# Patient Record
Sex: MALE | Race: WHITE | NOT HISPANIC OR LATINO | Employment: FULL TIME | URBAN - METROPOLITAN AREA
[De-identification: names, ages, dates, MRNs, and addresses within clinical notes are randomized per-mention and may not be internally consistent; named-entity substitution may affect disease eponyms.]

---

## 2019-05-01 ENCOUNTER — TRANSFERRED RECORDS (OUTPATIENT)
Dept: HEALTH INFORMATION MANAGEMENT | Facility: CLINIC | Age: 49
End: 2019-05-01

## 2021-06-02 ENCOUNTER — RECORDS - HEALTHEAST (OUTPATIENT)
Dept: ADMINISTRATIVE | Facility: CLINIC | Age: 51
End: 2021-06-02

## 2021-12-29 ENCOUNTER — E-VISIT (OUTPATIENT)
Dept: URGENT CARE | Facility: URGENT CARE | Age: 51
End: 2021-12-29
Payer: COMMERCIAL

## 2021-12-29 DIAGNOSIS — R06.02 SOB (SHORTNESS OF BREATH): Primary | ICD-10-CM

## 2021-12-29 PROCEDURE — 99421 OL DIG E/M SVC 5-10 MIN: CPT | Performed by: FAMILY MEDICINE

## 2021-12-30 NOTE — PATIENT INSTRUCTIONS
Dear Tariq cMcain,    We are sorry you are not feeling well. Based on the responses you provided, you may be experiencing a serious health condition that needs immediate in-person attention. It is recommended that you be seen in the emergency room in order to better evaluate your symptoms. Please click here to find the nearest Emergency Room.     Tony Boyd, DO

## 2022-01-01 ENCOUNTER — VIRTUAL VISIT (OUTPATIENT)
Dept: URGENT CARE | Facility: CLINIC | Age: 52
End: 2022-01-01
Payer: COMMERCIAL

## 2022-01-01 DIAGNOSIS — R06.02 SHORTNESS OF BREATH: Primary | ICD-10-CM

## 2022-01-01 PROCEDURE — 99207 PR NO BILLABLE SERVICE THIS VISIT: CPT

## 2022-01-02 NOTE — PROGRESS NOTES
Tariq is a 51 year old who is being evaluated via a billable video visit.      Video Start Time: 1840    Assessment & Plan     Shortness of breath  He has been having moderate fevers up to 102.5 as of last night along with shortness of breath and harsh cough.  Rapid Covid test at home today was negative, PCR test to the [facility] last week was negative.  Discussed that his PCR test last week could have been taken too soon.  Can still have Covid although could also have pneumonia.  Recommend he going to the emergency room tonight to be evaluated.    20 minutes spent on the date of the encounter doing chart review, patient visit and documentation        Tobacco Cessation:   reports that he has been smoking cigarettes. He has a 10.00 pack-year smoking history. He does not have any smokeless tobacco history on file.      See Patient Instructions    No follow-ups on file.    Virtual Urgent Care  [facility]UAL [facility]    Subjective   Tariq is a 51 year old who presents for the following health issues     HPI     51-year-old male presents to virtual urgent care via a video visit for cough and fever for the past week.  He has been running fevers up to 102-103 every day for the past week.  Has been having a harsh cough, shortness of breath, headache fatigue and now joint pain.  Had a PCR Covid test last week through the [facility] which was negative.  Took a rapid antigen test today which was negative.  Cough is productive of clear to yellow sputum.  No blood in the sputum.  Denies any chest pain but does have shortness of breath with any kind of activity.  Had an E-visit on December 29 through [facility] and was recommended he go into the emergency room to be seen.  He did not do this because he did not want to wait 2 hours to be seen    Review of Systems   Constitutional, HEENT, cardiovascular, pulmonary, gi and gu systems are negative, except as otherwise noted.      Objective           Vitals:  No  vitals were obtained today due to virtual visit.    Physical Exam   GENERAL: alert and mild distress  RESP: Harsh cough.  No increased work of breathing.  Can speak in complete sentences without any difficulty.            Video-Visit Details    Type of service:  Video Visit    Video End Time:6:49 PM    Originating Location (pt. Location): Home    Distant Location (provider location):  [facility] VIRTUAL URGENT CARE     Platform used for Video Visit: Appwo[employer]

## 2022-02-12 ENCOUNTER — HEALTH MAINTENANCE LETTER (OUTPATIENT)
Age: 52
End: 2022-02-12

## 2022-03-03 ENCOUNTER — VIRTUAL VISIT (OUTPATIENT)
Dept: INTERNAL MEDICINE | Facility: CLINIC | Age: 52
End: 2022-03-03
Payer: COMMERCIAL

## 2022-03-03 DIAGNOSIS — Z11.59 NEED FOR HEPATITIS C SCREENING TEST: ICD-10-CM

## 2022-03-03 DIAGNOSIS — E78.2 MIXED HYPERLIPIDEMIA: ICD-10-CM

## 2022-03-03 DIAGNOSIS — F41.1 GENERALIZED ANXIETY DISORDER: ICD-10-CM

## 2022-03-03 DIAGNOSIS — E03.9 HYPOTHYROIDISM, UNSPECIFIED TYPE: Primary | ICD-10-CM

## 2022-03-03 DIAGNOSIS — Z11.4 SCREENING FOR HIV (HUMAN IMMUNODEFICIENCY VIRUS): ICD-10-CM

## 2022-03-03 DIAGNOSIS — Z13.220 SCREENING FOR HYPERLIPIDEMIA: ICD-10-CM

## 2022-03-03 PROBLEM — K52.9 COLITIS: Status: ACTIVE | Noted: 2019-02-24

## 2022-03-03 PROCEDURE — 99214 OFFICE O/P EST MOD 30 MIN: CPT | Mod: GT | Performed by: INTERNAL MEDICINE

## 2022-03-03 RX ORDER — MULTIVITAMIN,THERAPEUTIC
1 TABLET ORAL DAILY
COMMUNITY

## 2022-03-03 RX ORDER — ALPRAZOLAM 0.5 MG
0.5 TABLET ORAL DAILY PRN
Qty: 15 TABLET | Refills: 0 | Status: SHIPPED | OUTPATIENT
Start: 2022-03-03 | End: 2022-05-27

## 2022-03-03 RX ORDER — LEVOTHYROXINE SODIUM 112 UG/1
112 TABLET ORAL DAILY
Qty: 90 TABLET | Refills: 3 | Status: SHIPPED | OUTPATIENT
Start: 2022-03-03 | End: 2023-04-30

## 2022-03-03 RX ORDER — OMEPRAZOLE 40 MG/1
40 CAPSULE, DELAYED RELEASE ORAL DAILY
COMMUNITY
End: 2024-03-06

## 2022-03-03 NOTE — PROGRESS NOTES
Tariq is a 51 year old who is being evaluated via a billable video visit.      How would you like to obtain your AVS? MyChart  If the video visit is dropped, the invitation should be resent by: Text to cell phone: [phone]  Will anyone else be joining your video visit? No    Video Start Time: 4:47pm    Assessment & Plan     Generalized anxiety disorder  Stable overall. Celexa. Uses #15 xanax over 5-6 months , renewed today   - ALPRAZolam (XANAX) 0.5 MG tablet; Take 1 tablet (0.5 mg) by mouth daily as needed for anxiety 1 TAB PO daily prn    Hypothyroidism, unspecified type  Recent decrease in dose. Recheck in near future. Orders placed.  - levothyroxine (SYNTHROID/LEVOTHROID) 112 MCG tablet; Take 1 tablet (112 mcg) by mouth daily  - TSH with free T4 reflex; Future    Mixed hyperlipidemia  LDL at 200 last check. Recheck soon as he has worked on his diet. Discussed possible need for medication--did have some adversity to statin--side effect in the past.  - Lipid panel reflex to direct LDL Fasting; Future      Return in about 6 months (around 9/3/2022) for Follow up, with me.    Ej De Leon MD  [facility]   Tariq is a 51 year old who presents for the following health issues     History of Present Illness     Reason for visit:  Medication Check  Symptoms include:  None  Symptom intensity:  Mild  Had these symptoms before:  No  What makes it worse:  NA  What makes it better:  NA    He eats 2-3 servings of fruits and vegetables daily.He consumes 0 sweetened beverage(s) daily.He exercises with enough effort to increase his heart rate 10 to 19 minutes per day.  He exercises with enough effort to increase his heart rate 4 days per week.   He is taking medications regularly.           Objective           Vitals:  No vitals were obtained today due to virtual visit.    Physical Exam   GENERAL: Healthy, alert and no distress  EYES: Eyes grossly normal to inspection.  No  discharge or erythema, or obvious scleral/conjunctival abnormalities.  RESP: No audible wheeze, cough, or visible cyanosis.  No visible retractions or increased work of breathing.    SKIN: Visible skin clear. No significant rash, abnormal pigmentation or lesions.  NEURO: Cranial nerves grossly intact.  Mentation and speech appropriate for age.  PSYCH: Mentation appears normal, affect normal/bright, judgement and insight intact, normal speech and appearance well-groomed.            Video-Visit Details    Type of service:  Video Visit    Video End Time:5pm    Originating Location (pt. Location): Home    Distant Location (provider location):  [facility]     Platform used for Video Visit: [email or web address]

## 2022-05-24 DIAGNOSIS — F41.1 GENERALIZED ANXIETY DISORDER: ICD-10-CM

## 2022-05-26 NOTE — TELEPHONE ENCOUNTER
Routing refill request to provider for review/approval because:  Drug not on the [facility] refill protocol     Last Written Prescription Date:  3/3/22  Last Fill Quantity: 15,  # refills: 0   Last office visit provider:  3/3/22     Requested Prescriptions   Pending Prescriptions Disp Refills     ALPRAZolam (XANAX) 0.5 MG tablet [Pharmacy Med Name: ALPRAZOLAM 0.5MG TABLETS] 15 tablet      Sig: TAKE 1 TABLET(0.5 MG) BY MOUTH DAILY AS NEEDED FOR ANXIETY       There is no refill protocol information for this order          Ina Aguilera, RN 05/25/22 9:38 PM

## 2022-05-27 RX ORDER — ALPRAZOLAM 0.5 MG
TABLET ORAL
Qty: 15 TABLET | Refills: 0 | Status: SHIPPED | OUTPATIENT
Start: 2022-05-27 | End: 2022-07-12

## 2022-05-27 NOTE — TELEPHONE ENCOUNTER
Controlled Substance Refill Request for ALPRAZolam (XANAX) 0.5 MG tablet    Last refill order signed: 03/03/2022 - 15 tablets w/ 0 refills  Sig - Route: Take 1 tablet (0.5 mg) by mouth daily as needed for anxiety 1 TAB PO daily prn - Oral    Last clinic visit: 03/03/2022 - virtual visit w/ Dr. De Leon      Clinic visit frequency required: Q 6  months  Next appt: none scheduled    Controlled substance agreement on file: No.    Documentation in problem list reviewed:  Yes   Associated Dx: Generalized anxiety disorder         Processing:  Rx to be electronically transmitted to pharmacy by provider       Monica Ho RN  [facility]    10:30 AM, May 27, 2022

## 2022-06-13 ENCOUNTER — OFFICE VISIT (OUTPATIENT)
Dept: URGENT CARE | Facility: URGENT CARE | Age: 52
End: 2022-06-13
Payer: COMMERCIAL

## 2022-06-13 ENCOUNTER — ANCILLARY PROCEDURE (OUTPATIENT)
Dept: GENERAL RADIOLOGY | Facility: CLINIC | Age: 52
End: 2022-06-13
Attending: PHYSICIAN ASSISTANT
Payer: COMMERCIAL

## 2022-06-13 VITALS
DIASTOLIC BLOOD PRESSURE: 86 MMHG | BODY MASS INDEX: 29.33 KG/M2 | HEART RATE: 80 BPM | SYSTOLIC BLOOD PRESSURE: 122 MMHG | HEIGHT: 69 IN | RESPIRATION RATE: 15 BRPM | TEMPERATURE: 99 F | WEIGHT: 198 LBS

## 2022-06-13 DIAGNOSIS — M25.532 LEFT WRIST PAIN: Primary | ICD-10-CM

## 2022-06-13 DIAGNOSIS — M25.532 LEFT WRIST PAIN: ICD-10-CM

## 2022-06-13 PROCEDURE — 99214 OFFICE O/P EST MOD 30 MIN: CPT | Performed by: PHYSICIAN ASSISTANT

## 2022-06-13 PROCEDURE — 73110 X-RAY EXAM OF WRIST: CPT | Mod: TC | Performed by: RADIOLOGY

## 2022-06-13 NOTE — PROGRESS NOTES
"URGENT CARE VISIT:    SUBJECTIVE:   Chief Complaint   Patient presents with     Urgent Care     Musculoskeletal Problem     Left wrist injury years ago, but did something last week and is very painful.       Tariq Mccain is a 51 year old male who presents with a chief complaint of left wrist pain and swelling.  Symptoms began 1 week(s) ago, are moderate and gradual onset  He had an injury many years ago and recently re injured it.   Pain exacerbated by movement. Relieved by rest.  He treated it initially with Ibuprofen and brace. This is not the first time this type of injury has occurred to this patient.     PMH:   Past Medical History:   Diagnosis Date     Anxiety state, unspecified      Depressive disorder      Allergies: Cefuroxime, Sertraline, and Simvastatin   Medications:   Current Outpatient Medications   Medication Sig Dispense Refill     citalopram (CELEXA) 10 MG tablet Take 20 mg by mouth daily       levothyroxine (SYNTHROID/LEVOTHROID) 112 MCG tablet Take 1 tablet (112 mcg) by mouth daily 90 tablet 3     multivitamin, therapeutic (THERA-VIT) TABS tablet Take 1 tablet by mouth daily       omeprazole (PRILOSEC) 40 MG DR capsule Take 40 mg by mouth daily       ALPRAZolam (XANAX) 0.5 MG tablet TAKE 1 TABLET(0.5 MG) BY MOUTH DAILY AS NEEDED FOR ANXIETY 15 tablet 0     Social History:   Social History     Tobacco Use     Smoking status: Former Smoker     Packs/day: 0.00     Years: 20.00     Pack years: 0.00     Types: Cigarettes, Other     Quit date: 2021     Years since quittin.4     Smokeless tobacco: Never Used     Tobacco comment: Quite Cigarettes 5 years ago. Was using E-Cig until 2021. Quit that.   Substance Use Topics     Alcohol use: Yes     Comment: Very Occasional       ROS:  Review of systems negative except as stated above.    OBJECTIVE:  /86   Pulse 80   Temp 99  F (37.2  C) (Oral)   Resp 15   Ht 1.753 m (5' 9\")   Wt 89.8 kg (198 lb)   BMI 29.24 kg/m  "   GENERAL APPEARANCE: healthy, alert and no distress  MUSCULOSKELETAL: moderate TTP over volar aspect of left wrist. FROM. Pain with ROM. 5/5 strength.  EXTREMITIES: peripheral pulses normal  SKIN: moderate generalized edema over volar aspect of left wrist.   NEURO: sensation intact     IMAGING:  Results for orders placed or performed in visit on 06/13/22   XR Wrist Left G/E 3 Views     Status: None    Narrative    WRIST LEFT THREE OR MORE VIEWS June 13, 2022 10:55 AM    HISTORY: Left wrist pain.    COMPARISON: None.    FINDINGS: Small chronic appearing ununited fracture of the proximal  pole of the scaphoid with associated cystic change in the waist of the  scaphoid. Mild degenerative changes at the STT joint. No definite  evidence of an acute fracture. Moderate degenerative changes at the  radial scaphoid articulation. CT or MRI may be helpful to further  evaluate the changes in the scaphoid.    RYANNE GRADY MD         SYSTEM ID:  [id number]       ASSESSMENT:    ICD-10-CM    1. Left wrist pain  M25.532 XR Wrist Left G/E 3 Views     Orthopedic  Referral     CANCELED: XR Wrist Left G/E 3 Views       PLAN:  30 minutes spent on the date of the encounter doing chart review, review of test results, interpretation of tests, patient visit and documentation.   Patient Instructions   Patient was educated on the natural course of injury. 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 No acute changes. Conservative measures discussed including rest, ice, compression, elevation, and over-the-counter analgesics (Tylenol or Ibuprofen) as needed. See your primary care provider if symptoms worsen or do not improve in 7 days. Referred to orthopaedic specialist. Seek emergency care if you develop severe pain/swelling, inability to move extremity, skin paleness, or weakness.     Patient verbalized understanding and is agreeable to plan. The patient was discharged ambulatory and in stable condition.    Jane  PARISH Moran on 6/13/2022 at 11:21 AM

## 2022-06-13 NOTE — PATIENT INSTRUCTIONS
Patient was educated on the natural course of injury. 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 No acute changes. Conservative measures discussed including rest, ice, compression, elevation, and over-the-counter analgesics (Tylenol or Ibuprofen) as needed. See your primary care provider if symptoms worsen or do not improve in 7 days. Seek emergency care if you develop severe pain/swelling, inability to move extremity, skin paleness, or weakness.

## 2022-06-15 ENCOUNTER — OFFICE VISIT (OUTPATIENT)
Dept: ORTHOPEDICS | Facility: CLINIC | Age: 52
End: 2022-06-15
Attending: PHYSICIAN ASSISTANT
Payer: COMMERCIAL

## 2022-06-15 VITALS — BODY MASS INDEX: 29.33 KG/M2 | HEIGHT: 69 IN | WEIGHT: 198 LBS

## 2022-06-15 DIAGNOSIS — M25.532 LEFT WRIST PAIN: ICD-10-CM

## 2022-06-15 PROCEDURE — 99204 OFFICE O/P NEW MOD 45 MIN: CPT | Performed by: FAMILY MEDICINE

## 2022-06-15 NOTE — LETTER
6/15/2022      RE: Tariq Mccain  [address]  [place] [address]     Dear Colleague,    Thank you for referring your patient, Tariq Mccain, to the [facility] [place]. Please see a copy of my visit note below.    CHIEF COMPLAINT:  Consult (Left wrist)       HISTORY OF PRESENT ILLNESS  Mr. Mccain is a pleasant 51 year old male who presents to clinic today with pain.  Tariq has been experiencing worsening pain in his left wrist over the past few years, this was acutely worsened recently when he was playing guitar, which he does frequently.  He points to the left wrist, generally.  Pain is deep inside.  This is worse with movement.  He does notice some swelling, as well.  He denies any significant weakness, although does notice that his left hand is weaker at times.    Tariq did have an active youth and does recall an incident snowboarding many years ago where he fell and possibly suffered a wrist injury.    Additional history: as documented    MEDICAL HISTORY  Patient Active Problem List   Diagnosis     Alcohol use disorder, moderate, in early remission (H)     Pastor's esophagus with low grade dysplasia     Colitis     Hyperlipidemia     Hypothyroidism     Kidney stone     Major depressive disorder, recurrent episode, in full remission (H)     Anxiety disorder       Current Outpatient Medications   Medication Sig Dispense Refill     ALPRAZolam (XANAX) 0.5 MG tablet TAKE 1 TABLET(0.5 MG) BY MOUTH DAILY AS NEEDED FOR ANXIETY 15 tablet 0     citalopram (CELEXA) 10 MG tablet Take 20 mg by mouth daily       levothyroxine (SYNTHROID/LEVOTHROID) 112 MCG tablet Take 1 tablet (112 mcg) by mouth daily 90 tablet 3     multivitamin, therapeutic (THERA-VIT) TABS tablet Take 1 tablet by mouth daily       omeprazole (PRILOSEC) 40 MG DR capsule Take 40 mg by mouth daily         Allergies   Allergen Reactions     Cefuroxime Rash     Sertraline Rash     Simvastatin Rash and  "Muscle Pain (Myalgia)       Family History   Problem Relation Age of Onset     Depression Mother      Anxiety Disorder Mother      Substance Abuse Father      Cancer Maternal Grandfather         skin      Thyroid Disease Maternal Grandfather      Diabetes Maternal Grandmother         type 2     Eye Disorder Maternal Grandmother         cataract     Anxiety Disorder Paternal Half-Brother        Additional medical/Social/Surgical histories reviewed in [facility] and updated as appropriate.        PHYSICAL EXAM  Ht 1.753 m (5' 9\")   Wt 89.8 kg (198 lb)   BMI 29.24 kg/m    General  - normal appearance, in no obvious distress  Musculoskeletal -left wrist  - inspection: generalized swelling and effusion  - palpation: Generalized wrist tenderness, dorsally  - ROM: Globally restricted  - strength: 5/5  strength, although objectively weaker compared to right  Neuro  - no sensory or motor deficit, grossly normal coordination, normal muscle tone                 ASSESSMENT & PLAN  Mr. Mccain is a 51 year old male who presents to clinic today with acute on chronic left wrist pain.    I reviewed his x-ray in the room with him, this does show advanced collapse of the scaphoid, specifically at the scapholunate joint.  There is surrounding osteoarthritis.    We did discuss possible etiologies for his pain, he very well may have suffered a fracture to his scaphoid at some point, resulting in collapse of the scaphoid.  The chronicity is difficult to tell, although I do suspect this is remote.    Given his pain we did discuss the utility of advanced imaging, I did order this.  I am also referring him to our hand surgeons for consult, based on the results.    He does have a wrist brace that he has been wearing, I do recommend wearing this for comfort.    It was a pleasure seeing Tariq today.    Celso Argueta DO, [facility]  Primary Care Sports Medicine      This note was constructed using Dragon dictation software, please excuse any minor " errors in spelling, grammar, or syntax.

## 2022-06-15 NOTE — PROGRESS NOTES
CHIEF COMPLAINT:  Consult (Left wrist)       HISTORY OF PRESENT ILLNESS  Mr. Mccain is a pleasant 51 year old male who presents to clinic today with pain.  Tariq has been experiencing worsening pain in his left wrist over the past few years, this was acutely worsened recently when he was playing guitar, which he does frequently.  He points to the left wrist, generally.  Pain is deep inside.  This is worse with movement.  He does notice some swelling, as well.  He denies any significant weakness, although does notice that his left hand is weaker at times.    Tariq did have an active youth and does recall an incident snowboarding many years ago where he fell and possibly suffered a wrist injury.    Additional history: as documented    MEDICAL HISTORY  Patient Active Problem List   Diagnosis     Alcohol use disorder, moderate, in early remission (H)     Pastor's esophagus with low grade dysplasia     Colitis     Hyperlipidemia     Hypothyroidism     Kidney stone     Major depressive disorder, recurrent episode, in full remission (H)     Anxiety disorder       Current Outpatient Medications   Medication Sig Dispense Refill     ALPRAZolam (XANAX) 0.5 MG tablet TAKE 1 TABLET(0.5 MG) BY MOUTH DAILY AS NEEDED FOR ANXIETY 15 tablet 0     citalopram (CELEXA) 10 MG tablet Take 20 mg by mouth daily       levothyroxine (SYNTHROID/LEVOTHROID) 112 MCG tablet Take 1 tablet (112 mcg) by mouth daily 90 tablet 3     multivitamin, therapeutic (THERA-VIT) TABS tablet Take 1 tablet by mouth daily       omeprazole (PRILOSEC) 40 MG DR capsule Take 40 mg by mouth daily         Allergies   Allergen Reactions     Cefuroxime Rash     Sertraline Rash     Simvastatin Rash and Muscle Pain (Myalgia)       Family History   Problem Relation Age of Onset     Depression Mother      Anxiety Disorder Mother      Substance Abuse Father      Cancer Maternal Grandfather         skin      Thyroid Disease Maternal Grandfather      Diabetes Maternal  "Grandmother         type 2     Eye Disorder Maternal Grandmother         cataract     Anxiety Disorder Paternal Half-Brother        Additional medical/Social/Surgical histories reviewed in [facility] and updated as appropriate.        PHYSICAL EXAM  Ht 1.753 m (5' 9\")   Wt 89.8 kg (198 lb)   BMI 29.24 kg/m    General  - normal appearance, in no obvious distress  Musculoskeletal -left wrist  - inspection: generalized swelling and effusion  - palpation: Generalized wrist tenderness, dorsally  - ROM: Globally restricted  - strength: 5/5  strength, although objectively weaker compared to right  Neuro  - no sensory or motor deficit, grossly normal coordination, normal muscle tone                 ASSESSMENT & PLAN  Mr. Mccain is a 51 year old male who presents to clinic today with acute on chronic left wrist pain.    I reviewed his x-ray in the room with him, this does show advanced collapse of the scaphoid, specifically at the scapholunate joint.  There is surrounding osteoarthritis.    We did discuss possible etiologies for his pain, he very well may have suffered a fracture to his scaphoid at some point, resulting in collapse of the scaphoid.  The chronicity is difficult to tell, although I do suspect this is remote.    Given his pain we did discuss the utility of advanced imaging, I did order this.  I am also referring him to our hand surgeons for consult, based on the results.    He does have a wrist brace that he has been wearing, I do recommend wearing this for comfort.    It was a pleasure seeing Tariq today.    Celso Argueta DO, [facility]  Primary Care Sports Medicine      This note was constructed using Dragon dictation software, please excuse any minor errors in spelling, grammar, or syntax.    "

## 2022-06-16 ENCOUNTER — TELEPHONE (OUTPATIENT)
Dept: ORTHOPEDICS | Facility: CLINIC | Age: 52
End: 2022-06-16
Payer: COMMERCIAL

## 2022-06-16 NOTE — TELEPHONE ENCOUNTER
LVM for paitent to call and schedule NEW HAND/WRIST appt with Tommy Rodriguez, Nghia or Tc for an ortho surgery consult per

## 2022-06-28 NOTE — TELEPHONE ENCOUNTER
DIAGNOSIS: (L) wrist scaphoid fracture / Jane Dean LUGO @ [facility] / [facility] / x-rays   APPOINTMENT DATE: 7.11.22   NOTES STATUS DETAILS   OFFICE NOTE from other specialist Internal/Care Everywhere 6.15.22 Dr Celso Argueta, [facility]  6.13.22 [facility]  2.14.18 [facility]   MEDICATION LIST Internal    LABS     CBC/DIFF Care Everywhere    XRAYS (IMAGES & REPORTS) PACS 6.13.22 L wrist  2.14.18 [facility]     Action 6.28.22 4:53 PM JON   Action Taken Faxed request to [facility] for image [phone]

## 2022-06-30 ENCOUNTER — ANCILLARY PROCEDURE (OUTPATIENT)
Dept: MRI IMAGING | Facility: CLINIC | Age: 52
End: 2022-06-30
Attending: FAMILY MEDICINE
Payer: COMMERCIAL

## 2022-06-30 DIAGNOSIS — M25.532 LEFT WRIST PAIN: ICD-10-CM

## 2022-06-30 PROCEDURE — 73221 MRI JOINT UPR EXTREM W/O DYE: CPT | Mod: LT | Performed by: RADIOLOGY

## 2022-07-11 ENCOUNTER — OFFICE VISIT (OUTPATIENT)
Dept: ORTHOPEDICS | Facility: CLINIC | Age: 52
End: 2022-07-11
Payer: COMMERCIAL

## 2022-07-11 ENCOUNTER — PRE VISIT (OUTPATIENT)
Dept: ORTHOPEDICS | Facility: CLINIC | Age: 52
End: 2022-07-11

## 2022-07-11 DIAGNOSIS — M19.132 SNAC (SCAPHOID NON-UNION ADVANCED COLLAPSE) OF WRIST, LEFT: Primary | ICD-10-CM

## 2022-07-11 DIAGNOSIS — S62.002S SNAC (SCAPHOID NON-UNION ADVANCED COLLAPSE) OF WRIST, LEFT: Primary | ICD-10-CM

## 2022-07-11 PROCEDURE — 99243 OFF/OP CNSLTJ NEW/EST LOW 30: CPT | Mod: 25 | Performed by: STUDENT IN AN ORGANIZED HEALTH CARE EDUCATION/TRAINING PROGRAM

## 2022-07-11 PROCEDURE — 20605 DRAIN/INJ JOINT/BURSA W/O US: CPT | Mod: LT | Performed by: STUDENT IN AN ORGANIZED HEALTH CARE EDUCATION/TRAINING PROGRAM

## 2022-07-11 RX ADMIN — LIDOCAINE HYDROCHLORIDE 5 ML: 10 INJECTION, SOLUTION EPIDURAL; INFILTRATION; INTRACAUDAL; PERINEURAL at 10:44

## 2022-07-11 RX ADMIN — TRIAMCINOLONE ACETONIDE 20 MG: 40 INJECTION, SUSPENSION INTRA-ARTICULAR; INTRAMUSCULAR at 10:44

## 2022-07-11 NOTE — PROGRESS NOTES
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 Tariq Mccain is a 51 year old male right hand dominant who presents with left wrist pain.  Ongoing for years.  Referred by Jane Moran for a consult. Thinks he sustained an injury when he was 16 years old when he fell out of a tree while hanging [place] lights.  States that the wrist has been acting up ever since.  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  He states the pain is intermittent.  It has improved recently with bracing and activity modification.  He is unable to do push-ups or tolerated to hyperextension.  He warms up and stretches now before playing the guitar.  He denies any numbness or tingling.  He has never had any steroid injections.      PAST MEDICAL HISTORY:  Past Medical History:   Diagnosis Date     Anxiety state, unspecified      Depressive disorder 2002       PAST SURGICAL HISTORY:  Past Surgical History:   Procedure Laterality Date     COLONOSCOPY  2021     ENT SURGERY  1973       MEDICATIONS:  Current Outpatient Medications   Medication     ALPRAZolam (XANAX) 0.5 MG tablet     citalopram (CELEXA) 10 MG tablet     levothyroxine (SYNTHROID/LEVOTHROID) 112 MCG tablet     multivitamin, therapeutic (THERA-VIT) TABS tablet     omeprazole (PRILOSEC) 40 MG DR capsule     No current facility-administered medications for this visit.       ALLERGIES:     Allergies   Allergen Reactions     Cefuroxime Rash     Sertraline Rash     Simvastatin Rash and Muscle Pain (Myalgia)       FAMILY HISTORY:  No pertinent family history    SOCIAL HISTORY:  Social History     Tobacco Use     Smoking status: Former Smoker     Packs/day: 0.00     Years: 20.00     Pack years: 0.00     Types:  Cigarettes, Other     Quit date: 2021     Years since quittin.5     Smokeless tobacco: Never Used     Tobacco comment: Quite Cigarettes 5 years ago. Was using E-Cig until 2021. Quit that.   Substance Use Topics     Alcohol use: Yes     Comment: Very Occasional     Drug use: Not Currently     Comment: Have not used drugs in many years, but did when I was young.       The patient's past medical, family, and social history was reviewed and confirmed.    REVIEW OF SYMPTOMS:      General: Negative   Eyes: Negative   Ear, Nose and Throat: Negative   Respiratory: Negative   Cardiovascular: Negative   Gastrointestinal: Negative   Genito-urinary: Negative   Musculoskeletal: Negative  Neurological: Negative   Psychological: Negative  HEME: Negative   ENDO: Negative   SKIN: Negative    VITALS:  There were no vitals filed for this visit.    EXAM:  General: NAD, A&Ox3  HEENT: NC/AT  CV: RRR by peripheral pulse  Pulmonary: Non-labored breathing on RA  LUE:  Skin intact, no obvious deformity.  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  MRI demonstrates radial lunate articulation is relatively well-preserved.  Type II lunate.    I have personally reviewed the above images and labs.         IMPRESSION AND RECOMMENDATIONS:  Mr. Tariq Mccain is a 51 year old male right hand dominant with left stage III SNAC wrist.    We discussed the patient's diagnosis and treatment options in detail today. This  included a description of the associated pathology and non-operative/operative treatment options with the use of illustrations and diagrams.    Options at this point include conservative management with bracing, anti-inflammatories, activity modification, and corticosteroid injections.  Surgical intervention would be in the form of left scaphoidectomy, radial styloidectomy, and 4 corner fusion.  I discussed that he should expect 30 to 50% loss of range of motion.    The patient's symptoms right now are quite good.  He has not yet exhausted all conservative measures.  He does not want to pursue the 4 corner fusion as he is an avid  and this would interfere with his ability to do that.  He would like to defer surgery for as well as possible.    I therefore recommended an injection today.  He would like to pursue this.  After obtaining written consent, I cleansed the skin over the dorsal wrist with chlorhexidine.  I then anesthetized skin with ethyl chloride freeze spray, after which I injected 40 mg of Kenalog and 1 cc of 1% lidocaine into the left radiocarpal joint through the 3-4 portal.  The patient tolerated this well and there were no complications.    Hand therapy referral placed for a custom thermoplastic orthosis or zipper brace to better immobilize the wrist during activities.    All of his questions were answered to his satisfaction he voiced understanding and agreement.  He will follow-up with me as needed      Gerry Montez MD    Hand, Upper Extremity & Microvascular Surgery  Department of Orthopaedic Surgery  [facility]

## 2022-07-11 NOTE — LETTER
7/11/2022         RE: Tariq Mccain  [address]  [facility] [address]        Dear Colleague,    Thank you for referring your patient, Tariq Mccain, to the [facility] [place]. Please see a copy of my visit note below.    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 Tariq Mccain is a 51 year old male right hand dominant who presents with left wrist pain.  Ongoing for years.  Referred by Jane Moran for a consult. Thinks he sustained an injury when he was 16 years old when he fell out of a tree while hanging [place] lights.  States that the wrist has been acting up ever since.  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  He states the pain is intermittent.  It has improved recently with bracing and activity modification.  He is unable to do push-ups or tolerated to hyperextension.  He warms up and stretches now before playing the guitar.  He denies any numbness or tingling.  He has never had any steroid injections.      PAST MEDICAL HISTORY:  Past Medical History:   Diagnosis Date     Anxiety state, unspecified      Depressive disorder 2002       PAST SURGICAL HISTORY:  Past Surgical History:   Procedure Laterality Date     COLONOSCOPY  2021     ENT SURGERY  1973       MEDICATIONS:  Current Outpatient Medications   Medication     ALPRAZolam (XANAX) 0.5 MG tablet     citalopram (CELEXA) 10 MG tablet     levothyroxine (SYNTHROID/LEVOTHROID) 112 MCG tablet     multivitamin, therapeutic (THERA-VIT) TABS tablet     omeprazole (PRILOSEC) 40 MG DR capsule     No current facility-administered medications for this visit.       ALLERGIES:     Allergies   Allergen Reactions     Cefuroxime Rash      Sertraline Rash     Simvastatin Rash and Muscle Pain (Myalgia)       FAMILY HISTORY:  No pertinent family history    SOCIAL HISTORY:  Social History     Tobacco Use     Smoking status: Former Smoker     Packs/day: 0.00     Years: 20.00     Pack years: 0.00     Types: Cigarettes, Other     Quit date: 2021     Years since quittin.5     Smokeless tobacco: Never Used     Tobacco comment: Quite Cigarettes 5 years ago. Was using E-Cig until 2021. Quit that.   Substance Use Topics     Alcohol use: Yes     Comment: Very Occasional     Drug use: Not Currently     Comment: Have not used drugs in many years, but did when I was young.       The patient's past medical, family, and social history was reviewed and confirmed.    REVIEW OF SYMPTOMS:      General: Negative   Eyes: Negative   Ear, Nose and Throat: Negative   Respiratory: Negative   Cardiovascular: Negative   Gastrointestinal: Negative   Genito-urinary: Negative   Musculoskeletal: Negative  Neurological: Negative   Psychological: Negative  HEME: Negative   ENDO: Negative   SKIN: Negative    VITALS:  There were no vitals filed for this visit.    EXAM:  General: NAD, A&Ox3  HEENT: NC/AT  CV: RRR by peripheral pulse  Pulmonary: Non-labored breathing on RA  LUE:  Skin intact, no obvious deformity.  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  MRI demonstrates radial lunate articulation is relatively well-preserved.  Type II lunate.    I have  personally reviewed the above images and labs.         IMPRESSION AND RECOMMENDATIONS:  Mr. Tariq Mccain is a 51 year old male right hand dominant with left stage III SNAC wrist.    We discussed the patient's diagnosis and treatment options in detail today. This included a description of the associated pathology and non-operative/operative treatment options with the use of illustrations and diagrams.    Options at this point include conservative management with bracing, anti-inflammatories, activity modification, and corticosteroid injections.  Surgical intervention would be in the form of left scaphoidectomy, radial styloidectomy, and 4 corner fusion.  I discussed that he should expect 30 to 50% loss of range of motion.    The patient's symptoms right now are quite good.  He has not yet exhausted all conservative measures.  He does not want to pursue the 4 corner fusion as he is an avid  and this would interfere with his ability to do that.  He would like to defer surgery for as well as possible.    I therefore recommended an injection today.  He would like to pursue this.  After obtaining written consent, I cleansed the skin over the dorsal wrist with chlorhexidine.  I then anesthetized skin with ethyl chloride freeze spray, after which I injected 40 mg of Kenalog and 1 cc of 1% lidocaine into the left radiocarpal joint through the 3-4 portal.  The patient tolerated this well and there were no complications.    Hand therapy referral placed for a custom thermoplastic orthosis or zipper brace to better immobilize the wrist during activities.    All of his questions were answered to his satisfaction he voiced understanding and agreement.  He will follow-up with me as needed      Gerry Montez MD    Hand, Upper Extremity & Microvascular Surgery  Department of Orthopaedic Surgery  [facility]        Hand / Upper Extremity Injection/Arthrocentesis    Date/Time: 7/11/2022  10:44 AM  Performed by: Gerry Montez MD  Authorized by: Gerry Montez MD     Indications:  Pain  Needle Size:  25 G  Guidance: landmark    Condition: carpal tunnel     Location comment:  Radiocarpal    Medications:  20 mg triamcinolone 40 MG/ML; 5 mL lidocaine (PF) 1 %  Outcome:  Tolerated well, no immediate complications  Procedure discussed: discussed risks, benefits, and alternatives    Consent Given by:  Patient  Timeout: timeout called immediately prior to procedure    Prep: patient was prepped and draped in usual sterile fashion        [facility]TH[facility] [address] [address]  [phone]  Dept: [phone]  ______________________________________________________________________________    Patient: Tariq Mccain   : 1970   MRN: [id number]   2022    INVASIVE PROCEDURE SAFETY CHECKLIST    Date: 2022   Procedure:Left Radial Carpal injection  Patient Name: Tariq Mccain  MRN: [id number]  YOB: 1970    Action: Complete sections as appropriate. Any discrepancy results in a HARD COPY until resolved.     PRE PROCEDURE:  Patient ID verified with 2 identifiers (name and  or MRN): Yes  Procedure and site verified with patient/designee (when able): Yes  Accurate consent documentation in medical record: Yes  H&P (or appropriate assessment) documented in medical record: Yes  H&P must be up to 20 days prior to procedure and updates within 24 hours of procedure as applicable: Yes  Relevant diagnostic and radiology test results appropriately labeled and displayed as applicable: NA  Procedure site(s) marked with provider initials: NA    TIMEOUT:  Time-Out performed immediately prior to starting procedure, including verbal and active participation of all team members addressing the following:Yes  * Correct patient identify  * Confirmed that the correct side and site are marked  * An accurate procedure consent form  *  Agreement on the procedure to be done  * Correct patient position  * Relevant images and results are properly labeled and appropriately displayed  * The need to administer antibiotics or fluids for irrigation purposes during the procedure as applicable   * Safety precautions based on patient history or medication use    DURING PROCEDURE: Verification of correct person, site, and procedures any time the responsibility for care of the patient is transferred to another member of the care team.       The following medications were given:         Prior to injection, verified patient identity using patient's name and date of birth.  Due to injection administration, patient instructed to remain in clinic for 15 minutes  afterwards, and to report any adverse reaction to me immediately.    Joint injection was performed.    Medication Name: Kenalog 40 NDC [address]-0  Drug Amount Wasted:  None.  Vial/Syringe: Single dose vial  Expiration Date:  02/01/2024    Medication Name Lidocaine NDC 36359-033-14    Scribed by TATO REA, ATC for Dr. Montez on July 11, 2022 at 10:17am based on the provider's statements to me.     TATO REA, ATC

## 2022-07-11 NOTE — PROGRESS NOTES
Hand / Upper Extremity Injection/Arthrocentesis    Date/Time: 2022 10:44 AM  Performed by: Gerry Montez MD  Authorized by: Gerry Montez MD     Indications:  Pain  Needle Size:  25 G  Guidance: landmark    Condition: carpal tunnel     Location comment:  Radiocarpal    Medications:  20 mg triamcinolone 40 MG/ML; 5 mL lidocaine (PF) 1 %  Outcome:  Tolerated well, no immediate complications  Procedure discussed: discussed risks, benefits, and alternatives    Consent Given by:  Patient  Timeout: timeout called immediately prior to procedure    Prep: patient was prepped and draped in usual sterile fashion        [facility] [address]  [address] [facility] [address] [phone]  Dept: [phone]  ______________________________________________________________________________    Patient: Tariq Mccain   : 1970   MRN: [id number]   2022    INVASIVE PROCEDURE SAFETY CHECKLIST    Date: 2022   Procedure:Left Radial Carpal injection  Patient Name: Tariq Mccain  MRN: [id number]  YOB: 1970    Action: Complete sections as appropriate. Any discrepancy results in a HARD COPY until resolved.     PRE PROCEDURE:  Patient ID verified with 2 identifiers (name and  or MRN): Yes  Procedure and site verified with patient/designee (when able): Yes  Accurate consent documentation in medical record: Yes  H&P (or appropriate assessment) documented in medical record: Yes  H&P must be up to 20 days prior to procedure and updates within 24 hours of procedure as applicable: Yes  Relevant diagnostic and radiology test results appropriately labeled and displayed as applicable: NA  Procedure site(s) marked with provider initials: NA    TIMEOUT:  Time-Out performed immediately prior to starting procedure, including verbal and active participation of all team members addressing the following:Yes  * Correct patient identify  * Confirmed that the  correct side and site are marked  * An accurate procedure consent form  * Agreement on the procedure to be done  * Correct patient position  * Relevant images and results are properly labeled and appropriately displayed  * The need to administer antibiotics or fluids for irrigation purposes during the procedure as applicable   * Safety precautions based on patient history or medication use    DURING PROCEDURE: Verification of correct person, site, and procedures any time the responsibility for care of the patient is transferred to another member of the care team.       The following medications were given:         Prior to injection, verified patient identity using patient's name and date of birth.  Due to injection administration, patient instructed to remain in clinic for 15 minutes  afterwards, and to report any adverse reaction to me immediately.    Joint injection was performed.    Medication Name: Kenalog 20mg/mL of Kenalog 40 NDC [address]-9  Drug Amount Wasted:  .5cc  Vial/Syringe: Single dose vial  Expiration Date:  02/01/2024    Joint injection was performed.    Medication Name:Lidocaine NDC 05155-401-18  Drug Amount Wasted:  4cc  Vial/Syringe: Single dose vial  Expiration Date:  02/01/2024    Scribed by TATO REA, MALKA for Dr. Montez on July 11, 2022 at 10:17am based on the provider's statements to me.     TATO REA ATC

## 2022-07-11 NOTE — NURSING NOTE
Reason For Visit:   Chief Complaint   Patient presents with     RECHECK     L wrist scaphoid fracture DOI: 6/13/22       Primary MD: Ej De Leon  Ref. MD: Dr. Argueta    Age: 51 year old    ?  No      There were no vitals taken for this visit.      Pain Assessment  Patient Currently in Pain: No    Hand Dominance Evaluation  Hand Dominance: Right          QuickDASH Assessment  No flowsheet data found.       Current Outpatient Medications   Medication Sig Dispense Refill     ALPRAZolam (XANAX) 0.5 MG tablet TAKE 1 TABLET(0.5 MG) BY MOUTH DAILY AS NEEDED FOR ANXIETY 15 tablet 0     citalopram (CELEXA) 10 MG tablet Take 20 mg by mouth daily       levothyroxine (SYNTHROID/LEVOTHROID) 112 MCG tablet Take 1 tablet (112 mcg) by mouth daily 90 tablet 3     multivitamin, therapeutic (THERA-VIT) TABS tablet Take 1 tablet by mouth daily       omeprazole (PRILOSEC) 40 MG DR capsule Take 40 mg by mouth daily         Allergies   Allergen Reactions     Cefuroxime Rash     Sertraline Rash     Simvastatin Rash and Muscle Pain (Myalgia)       REZA Sahu

## 2022-07-12 RX ORDER — LIDOCAINE HYDROCHLORIDE 10 MG/ML
5 INJECTION, SOLUTION EPIDURAL; INFILTRATION; INTRACAUDAL; PERINEURAL
Status: DISCONTINUED | OUTPATIENT
Start: 2022-07-11 | End: 2024-05-15

## 2022-07-12 RX ORDER — TRIAMCINOLONE ACETONIDE 40 MG/ML
20 INJECTION, SUSPENSION INTRA-ARTICULAR; INTRAMUSCULAR
Status: SHIPPED | OUTPATIENT
Start: 2022-07-11

## 2022-07-18 ENCOUNTER — THERAPY VISIT (OUTPATIENT)
Dept: OCCUPATIONAL THERAPY | Facility: CLINIC | Age: 52
End: 2022-07-18
Attending: STUDENT IN AN ORGANIZED HEALTH CARE EDUCATION/TRAINING PROGRAM
Payer: COMMERCIAL

## 2022-07-18 DIAGNOSIS — M19.132 SNAC (SCAPHOID NON-UNION ADVANCED COLLAPSE) OF WRIST, LEFT: ICD-10-CM

## 2022-07-18 DIAGNOSIS — S62.002S SNAC (SCAPHOID NON-UNION ADVANCED COLLAPSE) OF WRIST, LEFT: ICD-10-CM

## 2022-07-18 DIAGNOSIS — M25.532 LEFT WRIST PAIN: ICD-10-CM

## 2022-07-18 PROCEDURE — 97110 THERAPEUTIC EXERCISES: CPT | Mod: GO | Performed by: OCCUPATIONAL THERAPIST

## 2022-07-18 PROCEDURE — 97760 ORTHOTIC MGMT&TRAING 1ST ENC: CPT | Mod: GO | Performed by: OCCUPATIONAL THERAPIST

## 2022-07-18 PROCEDURE — 97165 OT EVAL LOW COMPLEX 30 MIN: CPT | Mod: GO | Performed by: OCCUPATIONAL THERAPIST

## 2022-07-18 NOTE — PROGRESS NOTES
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    Subjective:  Tariq Mccain is a 51 year old male.    Patient reports symptoms of the left wrist which occurred due to possible snowboard injury years before. Since onset symptoms are Gradually getting worse.  General health as reported by patient is good.  Pertinent medical history includes:  Past Medical History:   Diagnosis Date     Anxiety state, unspecified      Depressive disorder 2002     Medical allergies:  Allergies   Allergen Reactions     Cefuroxime Rash     Sertraline Rash     Simvastatin Rash and Muscle Pain (Myalgia)     Surgical history: other:   Past Surgical History:   Procedure Laterality Date     COLONOSCOPY  2021     ENT SURGERY  1973     Medication history:   Current Outpatient Medications   Medication     ALPRAZolam (XANAX) 0.5 MG tablet     citalopram (CELEXA) 10 MG tablet     levothyroxine (SYNTHROID/LEVOTHROID) 112 MCG tablet     multivitamin, therapeutic (THERA-VIT) TABS tablet     omeprazole (PRILOSEC) 40 MG DR capsule     Current Facility-Administered Medications   Medication     lidocaine (PF) (XYLOCAINE) 1 % injection 5 mL     triamcinolone (KENALOG-40) injection 20 mg     Current occupation is owner of own business, [employer], painting   Job Tasks: Computer Work, Driving, Lifting, Carrying, Prolonged Standing, Pushing, Pulling, Repetitive Tasks    Occupational Profile Information:  Right hand dominant  Prior functional level:  no limitations  Patient reports symptoms of pain, stiffness/loss of motion, weakness/loss of  strength and edema  Special tests:  x-ray and MRI.    Previous treatment: None, prior possible radius fracture   Barriers include:none  Mobility: No difficulty  Transportation: drives  Currently working in normal job without restrictions - has adapted   Leisure activities/hobbies: Guitar playing - many types   Other: Wants brace for night. Most things during day don't bother the wrist. Has left strap and OTC wrist brace. Had injection ~2 weeks ago. Notes flares of pain - not present all the time, even prior to injection.     Objective Outcome Measure:  TBD    Objective:  Pain Level (Scale 0-10)   7/18/2022   At Rest 0-1   With Use 8     Pain Description  Date 7/18/2022   Location Wrist, dorsal midcarpal, thenar area, ulnar wrist    Pain Quality Nagging, Sharp, Shooting and Throbbing   Frequency intermittent     Pain is worst  nighttime   Exacerbated by  Playing guitar, different positions, unpredictable    Relieved by Rest, bracing, aleve, ice    Progression Flares and then settles, a few times a year      Sensation    WNL throughout all nerve distributions; per patient report    Edema (Circumference measured in cm)   7/18/2022 7/18/2022    R L   DWC 18.1 17.8      Shoulder Screen  (Full or Limited)   7/18/2022 7/18/2022    R L   Reach behind head WFL WFL   Reach contralateral shoulder WFL WFL   Reach low back WFL WFL   Reach mid back WFL WFL   Reach scapula WFL WFL     ROM  Pain Report:  -none + mild    ++ moderate    +++ severe   Elbow 7/18/2022 7/18/2022   AROM (PROM) R L   Extension WFL WFL   Flexion WFL WFL     Wrist 7/18/2022 7/18/2022   AROM (PROM) R L   Extension 60 58   Flexion 45 35 Stiffness    RD 25 15   UD 30 15   Supination 65 70   Pronation 85 90     7/18/22: B digit AROM WFL     Thumb 7/18/2022 7/18/2022   AROM (PROM) R L   MP 0/45 0/25   IP 0/50 0/65   RABD 55 55   PABD 50 50     Strength   (Measured in pounds)  Pain Report: - none  + mild    ++ moderate    +++ severe    7/18/2022 7/18/2022    Trials R L   1  2  3 98 75   Average       Lat Pinch 7/18/2022 7/18/2022   Trials R L   1  2  3 18 18   Average       3 Pt Pinch 7/18/2022 7/18/2022   Trials R L   1  2  3 20 15   Average       Assessment:  Patient presents with symptoms consistent with diagnosis noted above, with conservative intervention.     Patient's limitations or Problem List includes:  Pain, Decreased ROM/motion, Increased edema, Weakness, Hypomobility, Decreased , Decreased pinch, Decreased coordination and Tightness in musculature of the left wrist and thumb which interferes with the patient's ability to perform Self Care Tasks (dressing, bathing), Work Tasks, Sleep Patterns, Recreational Activities, Household Chores and Driving  as compared to previous level of function.    Rehab Potential:  Good - Return to full activity, some limitations    Patient will benefit from skilled Occupational Therapy to increase ROM, flexibility, motion, overall strength,  strength, pinch strength, forearm strength, stability of wrist, stability of thumb, coordination and dexterity and decrease pain and edema to return to previous activity level and resume normal daily tasks and to reach their rehab potential.    Barriers to Learning:  No barrier    Communication Issues:  Patient appears to be able to clearly communicate and understand verbal and written communication and follow directions correctly.    Chart Review: Chart Review and Simple history review with patient    Identified Performance Deficits: bathing/showering, dressing, functional mobility, driving and community mobility, home establishment and management, meal preparation and cleanup, shopping, sleep and leisure activities    Assessment of Occupational Performance:  5 or more Performance Deficits    Clinical Decision Making (Complexity): Low complexity    Treatment Explanation:  The following has been discussed with the patient:  RX ordered/plan of care  Anticipated outcomes  Possible  risks and side effects    Plan:  Frequency:  1 X week, once daily  Duration:  for 6 weeks    Treatment Plan:    Modalities:    US, Iontophoresis, Fluidotherapy and Paraffin   Therapeutic Exercise:    AROM, AAROM, PROM, Tendon Gliding, Blocking, Isotonics, Isometrics and Stabilization  Therapeutic Activities:   Functional activities   Neuromuscular re-ed:   Nerve Gliding, Coordination/Dexterity, Kinesiotaping, Isometrics and Stabilization  Manual Techniques:   Coordination/Dexterity, Joint mobilization, Friction massage, Myofascial release and Manual edema mobilization  Orthotic Fabrication:    Static, Static progressive and Dynamic  Self Care:    Self Care Tasks, Ergonomic Considerations, Community Transportation and Work Tasks    Discharge Plan:    Achieve all LTG.  Independent in home treatment program.  Reach maximal therapeutic benefit.    Home Program:   Circumferential orthosis     PTRX Report  The following values have been [facility] to [facility].  Orthosis Wear and Care  EMR Notes  HEP - Sets  Reps  Sessions per day  Notes  Wrist Isometric Strengthening for ECRL DTM/Dart Throwers Motion  EMR Notes  HEP - Sets  Reps 5-10  Sessions per day 2-3  Notes Keep wrist straight, apply pressure from all four directions.  Wrist Isometric Strengthening for FCU DTM/Dart Throwers Motion  EMR Notes  HEP - Sets  Reps 5-10  Sessions per day 2-3  Notes Keep wrist straight, apply pressure from all four directions.    Next Visit:  Assess HEP  Assess orthosis   Progress wrist stability as tolerated   Assess positioning with guitar playing

## 2022-07-19 PROBLEM — M25.532 LEFT WRIST PAIN: Status: ACTIVE | Noted: 2022-07-19

## 2022-08-31 ENCOUNTER — THERAPY VISIT (OUTPATIENT)
Dept: OCCUPATIONAL THERAPY | Facility: CLINIC | Age: 52
End: 2022-08-31
Payer: COMMERCIAL

## 2022-08-31 DIAGNOSIS — M25.532 LEFT WRIST PAIN: Primary | ICD-10-CM

## 2022-08-31 PROCEDURE — 97110 THERAPEUTIC EXERCISES: CPT | Mod: GO | Performed by: OCCUPATIONAL THERAPIST

## 2022-08-31 PROCEDURE — 97535 SELF CARE MNGMENT TRAINING: CPT | Mod: GO | Performed by: OCCUPATIONAL THERAPIST

## 2022-09-15 ENCOUNTER — OFFICE VISIT (OUTPATIENT)
Dept: INTERNAL MEDICINE | Facility: CLINIC | Age: 52
End: 2022-09-15
Payer: COMMERCIAL

## 2022-09-15 VITALS
SYSTOLIC BLOOD PRESSURE: 127 MMHG | TEMPERATURE: 98.6 F | HEART RATE: 84 BPM | HEIGHT: 69 IN | WEIGHT: 198.2 LBS | DIASTOLIC BLOOD PRESSURE: 82 MMHG | BODY MASS INDEX: 29.36 KG/M2 | OXYGEN SATURATION: 97 % | RESPIRATION RATE: 16 BRPM

## 2022-09-15 DIAGNOSIS — K51.019 CHRONIC ULCERATIVE PANCOLITIS WITH COMPLICATION (H): ICD-10-CM

## 2022-09-15 DIAGNOSIS — Z11.4 SCREENING FOR HIV (HUMAN IMMUNODEFICIENCY VIRUS): ICD-10-CM

## 2022-09-15 DIAGNOSIS — E03.9 HYPOTHYROIDISM, UNSPECIFIED TYPE: ICD-10-CM

## 2022-09-15 DIAGNOSIS — Z72.0 TOBACCO ABUSE: ICD-10-CM

## 2022-09-15 DIAGNOSIS — Z13.220 LIPID SCREENING: ICD-10-CM

## 2022-09-15 DIAGNOSIS — F10.21 ALCOHOL USE DISORDER, MODERATE, IN EARLY REMISSION (H): ICD-10-CM

## 2022-09-15 DIAGNOSIS — Z12.11 SCREEN FOR COLON CANCER: ICD-10-CM

## 2022-09-15 DIAGNOSIS — F33.42 MAJOR DEPRESSIVE DISORDER, RECURRENT EPISODE, IN FULL REMISSION (H): Primary | ICD-10-CM

## 2022-09-15 DIAGNOSIS — Z11.59 NEED FOR HEPATITIS C SCREENING TEST: ICD-10-CM

## 2022-09-15 LAB
ALBUMIN SERPL BCG-MCNC: 4.4 G/DL (ref 3.5–5.2)
ALP SERPL-CCNC: 59 U/L (ref 40–129)
ALT SERPL W P-5'-P-CCNC: 25 U/L (ref 10–50)
ANION GAP SERPL CALCULATED.3IONS-SCNC: 14 MMOL/L (ref 7–15)
AST SERPL W P-5'-P-CCNC: 28 U/L (ref 10–50)
BILIRUB SERPL-MCNC: 0.6 MG/DL
BUN SERPL-MCNC: 16.5 MG/DL (ref 6–20)
CALCIUM SERPL-MCNC: 9.5 MG/DL (ref 8.6–10)
CHLORIDE SERPL-SCNC: 102 MMOL/L (ref 98–107)
CHOLEST SERPL-MCNC: 276 MG/DL
CREAT SERPL-MCNC: 1.07 MG/DL (ref 0.67–1.17)
DEPRECATED HCO3 PLAS-SCNC: 23 MMOL/L (ref 22–29)
GFR SERPL CREATININE-BSD FRML MDRD: 83 ML/MIN/1.73M2
GLUCOSE SERPL-MCNC: 77 MG/DL (ref 70–99)
HDLC SERPL-MCNC: 46 MG/DL
LDLC SERPL CALC-MCNC: 205 MG/DL
NONHDLC SERPL-MCNC: 230 MG/DL
POTASSIUM SERPL-SCNC: 4.5 MMOL/L (ref 3.4–5.3)
PROT SERPL-MCNC: 7.1 G/DL (ref 6.4–8.3)
SODIUM SERPL-SCNC: 139 MMOL/L (ref 136–145)
TRIGL SERPL-MCNC: 126 MG/DL

## 2022-09-15 PROCEDURE — 80061 LIPID PANEL: CPT | Performed by: INTERNAL MEDICINE

## 2022-09-15 PROCEDURE — 80053 COMPREHEN METABOLIC PANEL: CPT | Performed by: INTERNAL MEDICINE

## 2022-09-15 PROCEDURE — 84443 ASSAY THYROID STIM HORMONE: CPT | Performed by: INTERNAL MEDICINE

## 2022-09-15 PROCEDURE — 36415 COLL VENOUS BLD VENIPUNCTURE: CPT | Performed by: INTERNAL MEDICINE

## 2022-09-15 PROCEDURE — 99214 OFFICE O/P EST MOD 30 MIN: CPT | Performed by: INTERNAL MEDICINE

## 2022-09-15 RX ORDER — CITALOPRAM HYDROBROMIDE 10 MG/1
30 TABLET ORAL DAILY
Qty: 270 TABLET | Refills: 1 | Status: SHIPPED | OUTPATIENT
Start: 2022-09-15 | End: 2022-10-10

## 2022-09-15 RX ORDER — VARENICLINE TARTRATE 1 MG/1
1 TABLET, FILM COATED ORAL 2 TIMES DAILY
Qty: 60 TABLET | Refills: 3 | Status: SHIPPED | OUTPATIENT
Start: 2022-09-15 | End: 2022-09-16

## 2022-09-15 ASSESSMENT — PATIENT HEALTH QUESTIONNAIRE - PHQ9
SUM OF ALL RESPONSES TO PHQ QUESTIONS 1-9: 2
SUM OF ALL RESPONSES TO PHQ QUESTIONS 1-9: 2
10. IF YOU CHECKED OFF ANY PROBLEMS, HOW DIFFICULT HAVE THESE PROBLEMS MADE IT FOR YOU TO DO YOUR WORK, TAKE CARE OF THINGS AT HOME, OR GET ALONG WITH OTHER PEOPLE: SOMEWHAT DIFFICULT

## 2022-09-15 NOTE — PROGRESS NOTES
"  Assessment & Plan     (F33.42) Major depressive disorder, recurrent episode, in full remission (H)  (primary encounter diagnosis)  Comment: multifactorial stressors. On 20mg, interested in increasing this dose though.  Plan: citalopram (CELEXA) 10 MG tablet        Will go up to 30mg. See me in 6 months.    (K51.019) Chronic ulcerative pancolitis with complication (H)  Comment: quiescent, last colonoscopy in 2019  Plan: followed by [facility]    (F10.21) Alcohol use disorder, moderate, in early remission (H)  Comment: hx of  Plan: Comprehensive metabolic panel        Stable, abstinent.    (Z72.0) Tobacco abuse  Comment: returned to smoking earlier this year. Quit date for later this fall  Plan: varenicline (CHANTIX EMIL) 0.5 MG X 11 & 1 MG X         42 tablet, varenicline (CHANTIX) 1 MG tablet        chantix was helpful in the past--would like refill.    (E03.9) Hypothyroidism, unspecified type  Comment: adjustment earlier this year  Plan: TSH with free T4 reflex        Due for recheck               BMI:   Estimated body mass index is 29.27 kg/m  as calculated from the following:    Height as of this encounter: 1.753 m (5' 9\").    Weight as of this encounter: 89.9 kg (198 lb 3.2 oz).           No follow-ups on file.    Ej De Leon MD  [facility]   Tariq is a 51 year old accompanied by his alone, presenting for the following health issues:  Follow Up, Recheck Medication, and Refill Request (Pt states that he needs med refills.)    Chronic conditions reviewed and updated, see above.    History of Present Illness       Reason for visit:  Follow up visit    He eats 2-3 servings of fruits and vegetables daily.He consumes 0 sweetened beverage(s) daily.He exercises with enough effort to increase his heart rate 10 to 19 minutes per day.  He exercises with enough effort to increase his heart rate 3 or less days per week.   He is taking medications regularly.    Today's PHQ-9    " "     PHQ-9 Total Score: 2    PHQ-9 Q9 Thoughts of better off dead/self-harm past 2 weeks :   Not at all    How difficult have these problems made it for you to do your work, take care of things at home, or get along with other people: Somewhat difficult       Pt reports that he's being seen for follow up appointment, and med refills.      Review of Systems         Objective    /82 (BP Location: Left arm, Patient Position: Sitting, Cuff Size: Adult Large)   Pulse 84   Temp 98.6  F (37  C) (Oral)   Resp 16   Ht 1.753 m (5' 9\")   Wt 89.9 kg (198 lb 3.2 oz)   SpO2 97%   BMI 29.27 kg/m    Body mass index is 29.27 kg/m .  Physical Exam                       "

## 2022-09-16 LAB — TSH SERPL DL<=0.005 MIU/L-ACNC: 1.02 UIU/ML (ref 0.3–4.2)

## 2022-09-16 RX ORDER — VARENICLINE TARTRATE 1 MG/1
TABLET, FILM COATED ORAL
Qty: 180 TABLET | Refills: 0 | Status: SHIPPED | OUTPATIENT
Start: 2022-09-16 | End: 2023-08-16

## 2022-09-19 ENCOUNTER — TELEPHONE (OUTPATIENT)
Dept: INTERNAL MEDICINE | Facility: CLINIC | Age: 52
End: 2022-09-19

## 2022-09-19 NOTE — TELEPHONE ENCOUNTER
Central Prior Authorization Team   Phone: [phone]    PA Initiation    Medication: Citalopram   Insurance Company: [employer] - Phone [phone] Fax [phone]  Pharmacy Filling the Rx: [facility] #[id number] [place] [id number] AT [address]  Filling Pharmacy Phone: [phone]  Filling Pharmacy Fax:    Start Date: 9/19/2022

## 2022-09-20 NOTE — TELEPHONE ENCOUNTER
Prior Authorization Approval    Authorization Effective Date: 9/19/2022  Authorization Expiration Date: 9/19/2027  Medication: Citalopram   Approved Dose/Quantity:    Reference #:     Insurance Company: [place] [employer] [phone] Fax [phone]  Expected CoPay:       CoPay Card Available:      Foundation Assistance Needed:    Which Pharmacy is filling the prescription (Not needed for infusion/clinic administered): [id number] [place] [address] [address] AT [address]  Pharmacy Notified: Yes  Patient Notified: Yes

## 2022-10-10 ENCOUNTER — MYC MEDICAL ADVICE (OUTPATIENT)
Dept: INTERNAL MEDICINE | Facility: CLINIC | Age: 52
End: 2022-10-10

## 2022-10-10 ENCOUNTER — HEALTH MAINTENANCE LETTER (OUTPATIENT)
Age: 52
End: 2022-10-10

## 2022-10-10 DIAGNOSIS — E78.2 MIXED HYPERLIPIDEMIA: Primary | ICD-10-CM

## 2022-10-10 DIAGNOSIS — F33.42 MAJOR DEPRESSIVE DISORDER, RECURRENT EPISODE, IN FULL REMISSION (H): ICD-10-CM

## 2022-10-10 RX ORDER — CITALOPRAM HYDROBROMIDE 20 MG/1
20 TABLET ORAL DAILY
Qty: 90 TABLET | Refills: 3 | Status: SHIPPED | OUTPATIENT
Start: 2022-10-10 | End: 2023-10-11

## 2022-11-01 ENCOUNTER — TRANSFERRED RECORDS (OUTPATIENT)
Dept: HEALTH INFORMATION MANAGEMENT | Facility: CLINIC | Age: 52
End: 2022-11-01

## 2022-12-14 ENCOUNTER — MYC REFILL (OUTPATIENT)
Dept: INTERNAL MEDICINE | Facility: CLINIC | Age: 52
End: 2022-12-14

## 2022-12-14 DIAGNOSIS — F41.1 GENERALIZED ANXIETY DISORDER: ICD-10-CM

## 2022-12-15 RX ORDER — ALPRAZOLAM 0.5 MG
TABLET ORAL
Qty: 15 TABLET | Refills: 0 | Status: SHIPPED | OUTPATIENT
Start: 2022-12-15 | End: 2023-04-28

## 2022-12-20 ENCOUNTER — MYC MEDICAL ADVICE (OUTPATIENT)
Dept: INTERNAL MEDICINE | Facility: CLINIC | Age: 52
End: 2022-12-20

## 2022-12-20 DIAGNOSIS — N52.9 ERECTILE DYSFUNCTION, UNSPECIFIED ERECTILE DYSFUNCTION TYPE: Primary | ICD-10-CM

## 2022-12-21 RX ORDER — SILDENAFIL 100 MG/1
100 TABLET, FILM COATED ORAL DAILY PRN
Qty: 30 TABLET | Refills: 1 | Status: SHIPPED | OUTPATIENT
Start: 2022-12-21

## 2023-03-25 ENCOUNTER — HEALTH MAINTENANCE LETTER (OUTPATIENT)
Age: 53
End: 2023-03-25

## 2023-04-25 ENCOUNTER — TRANSFERRED RECORDS (OUTPATIENT)
Dept: HEALTH INFORMATION MANAGEMENT | Facility: CLINIC | Age: 53
End: 2023-04-25
Payer: COMMERCIAL

## 2023-04-28 DIAGNOSIS — F41.1 GENERALIZED ANXIETY DISORDER: ICD-10-CM

## 2023-04-28 RX ORDER — ALPRAZOLAM 0.5 MG
TABLET ORAL
Qty: 15 TABLET | Refills: 0 | Status: SHIPPED | OUTPATIENT
Start: 2023-04-28 | End: 2023-08-16

## 2023-04-30 DIAGNOSIS — E03.9 HYPOTHYROIDISM, UNSPECIFIED TYPE: ICD-10-CM

## 2023-04-30 RX ORDER — LEVOTHYROXINE SODIUM 112 UG/1
TABLET ORAL
Qty: 90 TABLET | Refills: 1 | Status: SHIPPED | OUTPATIENT
Start: 2023-04-30 | End: 2023-10-11

## 2023-04-30 NOTE — TELEPHONE ENCOUNTER
"Last Written Prescription Date:  3/3/22  Last Fill Quantity: 90,  # refills: 3   Last office visit provider:  9/15/22     Requested Prescriptions   Pending Prescriptions Disp Refills     levothyroxine (SYNTHROID/LEVOTHROID) 112 MCG tablet [Pharmacy Med Name: LEVOTHYROXINE 0.112MG (112MCG) TABS] 90 tablet 3     Sig: TAKE 1 TABLET(112 MCG) BY MOUTH DAILY       Thyroid Protocol Passed - 4/30/2023  4:19 PM        Passed - Patient is 12 years or older        Passed - Recent (12 mo) or future (30 days) visit within the authorizing provider's specialty     Patient has had an office visit with the authorizing provider or a provider within the authorizing providers department within the previous 12 mos or has a future within next 30 days. See \"Patient Info\" tab in inbasket, or \"Choose Columns\" in Meds & Orders section of the refill encounter.              Passed - Medication is active on med list        Passed - Normal TSH on file in past 12 months     Recent Labs   Lab Test 09/15/22  1536   TSH 1.02                   Tony Kaufman RN 04/30/23 4:19 PM  "

## 2023-08-02 ENCOUNTER — OFFICE VISIT (OUTPATIENT)
Dept: INTERNAL MEDICINE | Facility: CLINIC | Age: 53
End: 2023-08-02
Payer: COMMERCIAL

## 2023-08-02 VITALS
DIASTOLIC BLOOD PRESSURE: 88 MMHG | HEIGHT: 68 IN | HEART RATE: 80 BPM | RESPIRATION RATE: 18 BRPM | OXYGEN SATURATION: 97 % | SYSTOLIC BLOOD PRESSURE: 127 MMHG | WEIGHT: 204 LBS | BODY MASS INDEX: 30.92 KG/M2 | TEMPERATURE: 98 F

## 2023-08-02 DIAGNOSIS — F10.21 ALCOHOL USE DISORDER, MODERATE, IN EARLY REMISSION (H): ICD-10-CM

## 2023-08-02 DIAGNOSIS — K51.019 CHRONIC ULCERATIVE PANCOLITIS WITH COMPLICATION (H): ICD-10-CM

## 2023-08-02 DIAGNOSIS — F33.42 MAJOR DEPRESSIVE DISORDER, RECURRENT EPISODE, IN FULL REMISSION (H): Primary | ICD-10-CM

## 2023-08-02 PROBLEM — K52.9 COLITIS: Status: RESOLVED | Noted: 2019-02-24 | Resolved: 2023-08-02

## 2023-08-02 PROCEDURE — 99214 OFFICE O/P EST MOD 30 MIN: CPT | Performed by: INTERNAL MEDICINE

## 2023-08-02 ASSESSMENT — PATIENT HEALTH QUESTIONNAIRE - PHQ9
SUM OF ALL RESPONSES TO PHQ QUESTIONS 1-9: 2
10. IF YOU CHECKED OFF ANY PROBLEMS, HOW DIFFICULT HAVE THESE PROBLEMS MADE IT FOR YOU TO DO YOUR WORK, TAKE CARE OF THINGS AT HOME, OR GET ALONG WITH OTHER PEOPLE: SOMEWHAT DIFFICULT
SUM OF ALL RESPONSES TO PHQ QUESTIONS 1-9: 2

## 2023-08-02 NOTE — PROGRESS NOTES
"  Assessment & Plan   (F33.42) Major depressive disorder, recurrent episode, in full remission (H)  (primary encounter diagnosis)  Comment: stable on medication. Selective serotonin reuptake inhibitor. Occasional xanax use. He has significant cut back on alcohol use.  Plan: Will plan to continue on previous medication without changes     Reviewed hyperlipidemia-I suspect some degree of familial LDL disease. Regardless, he would like to work on exercise and diet prior to starting statin. Will plan to recheck this in three month.    (F10.21) Alcohol use disorder, moderate, in early remission (H)  Comment: much improved, still drinks some  Plan: continue to be mindful on consumption, volume.    (K51.019) Chronic ulcerative pancolitis with complication (H)  Comment: hx of  Plan: stable of late. Sees [facility].    Will see me for physical in Oct, deferred labs to that time              BMI:   Estimated body mass index is 31.48 kg/m  as calculated from the following:    Height as of this encounter: 1.715 m (5' 7.5\").    Weight as of this encounter: 92.5 kg (204 lb).           Ej De Leon MD  [facility]   Tariq is a 52 year old, presenting for the following health issues:  Office Visit (General care. No immediate concerns.) and Recheck Medication      8/2/2023     3:38 PM   Additional Questions   Roomed by sandy dunn   Accompanied by lidia         8/2/2023     3:38 PM   Patient Reported Additional Medications   Patient reports taking the following new medications none       History of Present Illness       Reason for visit:  Follow up    He eats 2-3 servings of fruits and vegetables daily.He consumes 0 sweetened beverage(s) daily.He exercises with enough effort to increase his heart rate 10 to 19 minutes per day.  He exercises with enough effort to increase his heart rate 3 or less days per week.   He is taking medications regularly.               Review of Systems       " "  Objective    /88 (BP Location: Left arm, Patient Position: Sitting, Cuff Size: Adult Regular)   Pulse 80   Temp 98  F (36.7  C) (Oral)   Resp 18   Ht 1.715 m (5' 7.5\")   Wt 92.5 kg (204 lb)   SpO2 97%   BMI 31.48 kg/m    Body mass index is 31.48 kg/m .  Physical Exam                       Answers submitted by the patient for this visit:  Patient Health Questionnaire (Submitted on 8/2/2023)  If you checked off any problems, how difficult have these problems made it for you to do your work, take care of things at home, or get along with other people?: Somewhat difficult  PHQ9 TOTAL SCORE: 2  General Questionnaire (Submitted on 8/2/2023)  Chief Complaint: Chronic problems general questions HPI Form  What is the reason for your visit today? : Follow up  How many servings of fruits and vegetables do you eat daily?: 2-3  On average, how many sweetened beverages do you drink each day (Examples: soda, juice, sweet tea, etc.  Do NOT count diet or artificially sweetened beverages)?: 0  How many minutes a day do you exercise enough to make your heart beat faster?: 10 to 19  How many days a week do you exercise enough to make your heart beat faster?: 3 or less  How many days per week do you miss taking your medication?: 0    "

## 2023-08-16 ENCOUNTER — MYC REFILL (OUTPATIENT)
Dept: INTERNAL MEDICINE | Facility: CLINIC | Age: 53
End: 2023-08-16
Payer: COMMERCIAL

## 2023-08-16 DIAGNOSIS — F41.1 GENERALIZED ANXIETY DISORDER: ICD-10-CM

## 2023-08-16 DIAGNOSIS — Z72.0 TOBACCO ABUSE: ICD-10-CM

## 2023-08-17 RX ORDER — VARENICLINE TARTRATE 1 MG/1
1 TABLET, FILM COATED ORAL 2 TIMES DAILY
Qty: 180 TABLET | Refills: 0 | Status: SHIPPED | OUTPATIENT
Start: 2023-08-17 | End: 2023-10-25

## 2023-08-17 RX ORDER — ALPRAZOLAM 0.5 MG
TABLET ORAL
Qty: 15 TABLET | Refills: 0 | Status: SHIPPED | OUTPATIENT
Start: 2023-08-17 | End: 2023-09-26

## 2023-08-17 NOTE — TELEPHONE ENCOUNTER
"chantix  Routing refill request to provider for review/approval because:  A break in medication    Last Written Prescription Date:  9/16/2022  Last Fill Quantity: 180,  # refills: 0   Last office visit provider:  8/2/2023       xanax  Routing refill request to provider for review/approval because:  Drug not on the [facility] refill protocol     Last Written Prescription Date:  4/28/2023  Last Fill Quantity: 15,  # refills: 0   Last office visit provider:  8/2/2023     Requested Prescriptions   Pending Prescriptions Disp Refills    varenicline (CHANTIX) 1 MG tablet 180 tablet 0     Sig: Take 1 tablet (1 mg) by mouth 2 times daily       Partial Cholinergic Nicotinic Agonist Agents Passed - 8/16/2023 11:05 PM        Passed - Blood pressure under 140/90 in past 12 months     BP Readings from Last 3 Encounters:   08/02/23 127/88   09/15/22 127/82   06/13/22 122/86                 Passed - Recent (12 mo) or future (30 days) visit within the authorizing provider's specialty     Patient has had an office visit with the authorizing provider or a provider within the authorizing providers department within the previous 12 mos or has a future within next 30 days. See \"Patient Info\" tab in inbasket, or \"Choose Columns\" in Meds & Orders section of the refill encounter.              Passed - Medication is active on med list        Passed - Patient is 18 years of age or older          ALPRAZolam (XANAX) 0.5 MG tablet 15 tablet 0     Sig: TAKE 1 TABLET(0.5 MG) BY MOUTH DAILY AS NEEDED FOR ANXIETY       There is no refill protocol information for this order          Sushma Aleman RN 08/16/23 11:23 PM  "

## 2023-09-26 ENCOUNTER — MYC MEDICAL ADVICE (OUTPATIENT)
Dept: INTERNAL MEDICINE | Facility: CLINIC | Age: 53
End: 2023-09-26
Payer: COMMERCIAL

## 2023-09-26 DIAGNOSIS — F41.1 GENERALIZED ANXIETY DISORDER: ICD-10-CM

## 2023-09-26 RX ORDER — ALPRAZOLAM 0.5 MG
TABLET ORAL
Qty: 15 TABLET | Refills: 0 | Status: SHIPPED | OUTPATIENT
Start: 2023-09-26 | End: 2023-12-14

## 2023-10-11 DIAGNOSIS — E03.9 HYPOTHYROIDISM, UNSPECIFIED TYPE: ICD-10-CM

## 2023-10-11 DIAGNOSIS — F33.42 MAJOR DEPRESSIVE DISORDER, RECURRENT EPISODE, IN FULL REMISSION (H): ICD-10-CM

## 2023-10-11 RX ORDER — LEVOTHYROXINE SODIUM 112 UG/1
TABLET ORAL
Qty: 60 TABLET | Refills: 0 | Status: SHIPPED | OUTPATIENT
Start: 2023-10-11 | End: 2023-10-11

## 2023-10-11 RX ORDER — CITALOPRAM HYDROBROMIDE 20 MG/1
20 TABLET ORAL DAILY
Qty: 90 TABLET | Refills: 0 | Status: SHIPPED | OUTPATIENT
Start: 2023-10-11 | End: 2024-01-02

## 2023-10-11 RX ORDER — LEVOTHYROXINE SODIUM 112 UG/1
TABLET ORAL
Qty: 90 TABLET | Refills: 0 | Status: SHIPPED | OUTPATIENT
Start: 2023-10-11 | End: 2024-01-02

## 2023-10-25 ENCOUNTER — OFFICE VISIT (OUTPATIENT)
Dept: INTERNAL MEDICINE | Facility: CLINIC | Age: 53
End: 2023-10-25
Payer: COMMERCIAL

## 2023-10-25 VITALS
DIASTOLIC BLOOD PRESSURE: 88 MMHG | HEART RATE: 81 BPM | TEMPERATURE: 97.8 F | SYSTOLIC BLOOD PRESSURE: 120 MMHG | WEIGHT: 207 LBS | OXYGEN SATURATION: 97 % | RESPIRATION RATE: 16 BRPM | HEIGHT: 69 IN | BODY MASS INDEX: 30.66 KG/M2

## 2023-10-25 DIAGNOSIS — R07.9 CHEST PAIN ON EXERTION: ICD-10-CM

## 2023-10-25 DIAGNOSIS — F41.1 GENERALIZED ANXIETY DISORDER: Primary | ICD-10-CM

## 2023-10-25 DIAGNOSIS — E03.9 ACQUIRED HYPOTHYROIDISM: ICD-10-CM

## 2023-10-25 DIAGNOSIS — E78.2 MIXED HYPERLIPIDEMIA: ICD-10-CM

## 2023-10-25 LAB
ATRIAL RATE - MUSE: 75 BPM
DIASTOLIC BLOOD PRESSURE - MUSE: NORMAL MMHG
INTERPRETATION ECG - MUSE: NORMAL
P AXIS - MUSE: 23 DEGREES
PR INTERVAL - MUSE: 176 MS
QRS DURATION - MUSE: 84 MS
QT - MUSE: 384 MS
QTC - MUSE: 428 MS
R AXIS - MUSE: 32 DEGREES
SYSTOLIC BLOOD PRESSURE - MUSE: NORMAL MMHG
T AXIS - MUSE: 33 DEGREES
VENTRICULAR RATE- MUSE: 75 BPM

## 2023-10-25 PROCEDURE — 93010 ELECTROCARDIOGRAM REPORT: CPT | Performed by: INTERNAL MEDICINE

## 2023-10-25 PROCEDURE — 93005 ELECTROCARDIOGRAM TRACING: CPT | Performed by: INTERNAL MEDICINE

## 2023-10-25 PROCEDURE — 84443 ASSAY THYROID STIM HORMONE: CPT | Performed by: INTERNAL MEDICINE

## 2023-10-25 PROCEDURE — 80053 COMPREHEN METABOLIC PANEL: CPT | Performed by: INTERNAL MEDICINE

## 2023-10-25 PROCEDURE — 99214 OFFICE O/P EST MOD 30 MIN: CPT | Mod: 25 | Performed by: INTERNAL MEDICINE

## 2023-10-25 PROCEDURE — 80061 LIPID PANEL: CPT | Performed by: INTERNAL MEDICINE

## 2023-10-25 PROCEDURE — 36415 COLL VENOUS BLD VENIPUNCTURE: CPT | Performed by: INTERNAL MEDICINE

## 2023-10-25 ASSESSMENT — PAIN SCALES - GENERAL: PAINLEVEL: NO PAIN (0)

## 2023-10-25 NOTE — PROGRESS NOTES
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 He will update me as needed. Currently he has only used a few xanax in the last 3 weeks.    (E03.9) Acquired hypothyroidism  Comment: on replacement  Plan: TSH WITH FREE T4 REFLEX        Recheck today    (R07.9) Chest pain on exertion  Comment: intermittent exertional type burning/shoulder pain, EKG okay today.  Plan: EKG 12-lead, tracing only        Some higher risk (intermittent smoker), high LDL, sedentary--recommend stress echo to further risk stratify. Discussed in detail with patient. Will get this scheduled shortly.    (E78.2) Mixed hyperlipidemia  Comment: previously resistant to statin though did try simvastatin and had some muscle problems.  Plan: Lipid panel reflex to direct LDL Fasting,         Comprehensive metabolic panel        He is fasting today and would like a recheck. I would strongly urge him to restart statin if LDL not significantly lower.    See me in 6 months for routine follow up though will review results of above labs and imaging in the interim.                BMI:   Estimated body mass index is 31.02 kg/m  as calculated from the following:    Height as of this encounter: 1.74 m (5' 8.5\").    Weight as of this encounter: 93.9 kg (207 lb).           Ej De Leon MD  [facility]    Subjective   Tariq is a 52 year old, presenting for the following health issues:  RECHECK (Medicaiton recheck, labs) and Chest Pain (Intermittent chest pain in middle of chest that radiates to shoulders, down arms to hands. Resolves in less than 2 minutes. Does not happen with just exertion. Random. Denies " "SOB with pain.)        10/25/2023    11:09 AM   Additional Questions   Roomed by Lizbeth ORO       History of Present Illness       Hyperlipidemia:  He presents for follow up of hyperlipidemia.   He is not taking medication to lower cholesterol. He is not having myalgia or other side effects to statin medications.    Hypothyroidism:     Since last visit, patient describes the following symptoms::  None    He eats 2-3 servings of fruits and vegetables daily.He consumes 0 sweetened beverage(s) daily.He exercises with enough effort to increase his heart rate 10 to 19 minutes per day.  He exercises with enough effort to increase his heart rate 3 or less days per week.   He is taking medications regularly.                 Review of Systems         Objective    /88 (BP Location: Left arm, Patient Position: Sitting, Cuff Size: Adult Large)   Pulse 81   Temp 97.8  F (36.6  C) (Tympanic)   Resp 16   Ht 1.74 m (5' 8.5\")   Wt 93.9 kg (207 lb)   SpO2 97%   BMI 31.02 kg/m    Body mass index is 31.02 kg/m .  Physical Exam                         "

## 2023-10-26 ENCOUNTER — MYC MEDICAL ADVICE (OUTPATIENT)
Dept: INTERNAL MEDICINE | Facility: CLINIC | Age: 53
End: 2023-10-26
Payer: COMMERCIAL

## 2023-10-26 LAB
ALBUMIN SERPL BCG-MCNC: 4.4 G/DL (ref 3.5–5.2)
ALP SERPL-CCNC: 51 U/L (ref 40–129)
ALT SERPL W P-5'-P-CCNC: 33 U/L (ref 0–70)
ANION GAP SERPL CALCULATED.3IONS-SCNC: 9 MMOL/L (ref 7–15)
AST SERPL W P-5'-P-CCNC: 27 U/L (ref 0–45)
BILIRUB SERPL-MCNC: 0.4 MG/DL
BUN SERPL-MCNC: 21.2 MG/DL (ref 6–20)
CALCIUM SERPL-MCNC: 9.5 MG/DL (ref 8.6–10)
CHLORIDE SERPL-SCNC: 103 MMOL/L (ref 98–107)
CHOLEST SERPL-MCNC: 263 MG/DL
CREAT SERPL-MCNC: 1.04 MG/DL (ref 0.67–1.17)
DEPRECATED HCO3 PLAS-SCNC: 25 MMOL/L (ref 22–29)
EGFRCR SERPLBLD CKD-EPI 2021: 85 ML/MIN/1.73M2
GLUCOSE SERPL-MCNC: 91 MG/DL (ref 70–99)
HDLC SERPL-MCNC: 42 MG/DL
LDLC SERPL CALC-MCNC: 191 MG/DL
NONHDLC SERPL-MCNC: 221 MG/DL
POTASSIUM SERPL-SCNC: 4.9 MMOL/L (ref 3.4–5.3)
PROT SERPL-MCNC: 7.2 G/DL (ref 6.4–8.3)
SODIUM SERPL-SCNC: 137 MMOL/L (ref 135–145)
TRIGL SERPL-MCNC: 149 MG/DL
TSH SERPL DL<=0.005 MIU/L-ACNC: 0.67 UIU/ML (ref 0.3–4.2)

## 2023-12-14 ENCOUNTER — MYC MEDICAL ADVICE (OUTPATIENT)
Dept: INTERNAL MEDICINE | Facility: CLINIC | Age: 53
End: 2023-12-14
Payer: COMMERCIAL

## 2023-12-14 DIAGNOSIS — F41.1 GENERALIZED ANXIETY DISORDER: ICD-10-CM

## 2023-12-14 DIAGNOSIS — E78.2 MIXED HYPERLIPIDEMIA: ICD-10-CM

## 2023-12-14 DIAGNOSIS — R07.9 INTERMITTENT CHEST PAIN: Primary | ICD-10-CM

## 2023-12-14 RX ORDER — ATORVASTATIN CALCIUM 20 MG/1
20 TABLET, FILM COATED ORAL DAILY
Qty: 90 TABLET | Refills: 3 | Status: SHIPPED | OUTPATIENT
Start: 2023-12-14

## 2023-12-14 RX ORDER — ALPRAZOLAM 0.5 MG
TABLET ORAL
Qty: 15 TABLET | Refills: 0 | Status: SHIPPED | OUTPATIENT
Start: 2023-12-14 | End: 2024-02-15

## 2023-12-27 ENCOUNTER — MYC MEDICAL ADVICE (OUTPATIENT)
Dept: INTERNAL MEDICINE | Facility: CLINIC | Age: 53
End: 2023-12-27
Payer: COMMERCIAL

## 2023-12-28 NOTE — TELEPHONE ENCOUNTER
I see tress test ordered, but not completed.  Please check with pt and if he has had it done, call cardiology for reports.    Dr SALAZAR

## 2024-01-02 DIAGNOSIS — F33.42 MAJOR DEPRESSIVE DISORDER, RECURRENT EPISODE, IN FULL REMISSION (H): ICD-10-CM

## 2024-01-02 DIAGNOSIS — E03.9 HYPOTHYROIDISM, UNSPECIFIED TYPE: ICD-10-CM

## 2024-01-02 RX ORDER — CITALOPRAM HYDROBROMIDE 20 MG/1
20 TABLET ORAL DAILY
Qty: 90 TABLET | Refills: 0 | Status: SHIPPED | OUTPATIENT
Start: 2024-01-02 | End: 2024-04-12

## 2024-01-02 RX ORDER — LEVOTHYROXINE SODIUM 112 UG/1
TABLET ORAL
Qty: 90 TABLET | Refills: 2 | Status: SHIPPED | OUTPATIENT
Start: 2024-01-02 | End: 2024-09-16

## 2024-02-15 ENCOUNTER — VIRTUAL VISIT (OUTPATIENT)
Dept: INTERNAL MEDICINE | Facility: CLINIC | Age: 54
End: 2024-02-15
Payer: COMMERCIAL

## 2024-02-15 DIAGNOSIS — F33.42 MAJOR DEPRESSIVE DISORDER, RECURRENT EPISODE, IN FULL REMISSION (H): Primary | ICD-10-CM

## 2024-02-15 DIAGNOSIS — F10.21 ALCOHOL USE DISORDER, MODERATE, IN EARLY REMISSION (H): ICD-10-CM

## 2024-02-15 DIAGNOSIS — K51.019 CHRONIC ULCERATIVE PANCOLITIS WITH COMPLICATION (H): ICD-10-CM

## 2024-02-15 DIAGNOSIS — F41.1 GENERALIZED ANXIETY DISORDER: ICD-10-CM

## 2024-02-15 PROCEDURE — 99214 OFFICE O/P EST MOD 30 MIN: CPT | Mod: 95 | Performed by: INTERNAL MEDICINE

## 2024-02-15 RX ORDER — ALPRAZOLAM 0.5 MG
TABLET ORAL
Qty: 15 TABLET | Refills: 0 | Status: SHIPPED | OUTPATIENT
Start: 2024-02-15 | End: 2024-05-08

## 2024-02-15 ASSESSMENT — ANXIETY QUESTIONNAIRES
GAD7 TOTAL SCORE: 1
4. TROUBLE RELAXING: NOT AT ALL
5. BEING SO RESTLESS THAT IT IS HARD TO SIT STILL: NOT AT ALL
IF YOU CHECKED OFF ANY PROBLEMS ON THIS QUESTIONNAIRE, HOW DIFFICULT HAVE THESE PROBLEMS MADE IT FOR YOU TO DO YOUR WORK, TAKE CARE OF THINGS AT HOME, OR GET ALONG WITH OTHER PEOPLE: NOT DIFFICULT AT ALL
8. IF YOU CHECKED OFF ANY PROBLEMS, HOW DIFFICULT HAVE THESE MADE IT FOR YOU TO DO YOUR WORK, TAKE CARE OF THINGS AT HOME, OR GET ALONG WITH OTHER PEOPLE?: NOT DIFFICULT AT ALL
7. FEELING AFRAID AS IF SOMETHING AWFUL MIGHT HAPPEN: NOT AT ALL
3. WORRYING TOO MUCH ABOUT DIFFERENT THINGS: NOT AT ALL
1. FEELING NERVOUS, ANXIOUS, OR ON EDGE: NOT AT ALL
7. FEELING AFRAID AS IF SOMETHING AWFUL MIGHT HAPPEN: NOT AT ALL
6. BECOMING EASILY ANNOYED OR IRRITABLE: SEVERAL DAYS
GAD7 TOTAL SCORE: 1
GAD7 TOTAL SCORE: 1
2. NOT BEING ABLE TO STOP OR CONTROL WORRYING: NOT AT ALL

## 2024-02-15 ASSESSMENT — PATIENT HEALTH QUESTIONNAIRE - PHQ9
SUM OF ALL RESPONSES TO PHQ QUESTIONS 1-9: 0
SUM OF ALL RESPONSES TO PHQ QUESTIONS 1-9: 0

## 2024-02-15 NOTE — PROGRESS NOTES
"Tariq is a 53 year old who is being evaluated via a billable video visit.      How would you like to obtain your AVS? MyChart  If the video visit is dropped, the invitation should be resent by:   Will anyone else be joining your video visit? No          Assessment & Plan     (F33.42) Major depressive disorder, recurrent episode, in full remission (H24)  (primary encounter diagnosis)  Comment: chronic, ssri. Some speaking related anxiety continues. Xanax has been quite helpful, last about 4 hours, no side effects. Discussed bblocker as well today  Plan: ALPRAZolam (XANAX) 0.5 MG tablet        Given the effectiveness and only 1-2 days per week, okay to stick with this med. The potential side effects of this medication were discussed.   Will review again at our next meeting    (R18.019) Chronic ulcerative pancolitis with complication (H)  Comment: stable  Plan: sees GI    (F10.21) Alcohol use disorder, moderate, in early remission (H)  Comment: denies active use at this time  Plan: ongoing surveillance.    See me in 6 months or so            BMI  Estimated body mass index is 31.02 kg/m  as calculated from the following:    Height as of 10/25/23: 1.74 m (5' 8.5\").    Weight as of 10/25/23: 93.9 kg (207 lb).             Subjective   Tariq is a 53 year old, presenting for the following health issues:  Recheck Medication (Follow-up medication)      2/15/2024     2:13 PM   Additional Questions   Roomed by Corina   Accompanied by N/A         2/15/2024     2:13 PM   Patient Reported Additional Medications   Patient reports taking the following new medications N/A     History of Present Illness       Mental Health Follow-up:  Patient presents to follow-up on Depression & Anxiety.Patient's depression since last visit has been:  Good  The patient is not having other symptoms associated with depression.  Patient's anxiety since last visit has been:  Good  The patient is not having other symptoms associated with anxiety.  Any " significant life events: relationship concerns, grief or loss and other  Patient is not feeling anxious or having panic attacks.  Patient has no concerns about alcohol or drug use.    He eats 2-3 servings of fruits and vegetables daily.He consumes 0 sweetened beverage(s) daily.He exercises with enough effort to increase his heart rate 10 to 19 minutes per day.  He exercises with enough effort to increase his heart rate 7 days per week.   He is taking medications regularly.                   Objective           Vitals:  No vitals were obtained today due to virtual visit.    Physical Exam   GENERAL: alert and no distress  EYES: Eyes grossly normal to inspection.  No discharge or erythema, or obvious scleral/conjunctival abnormalities.  RESP: No audible wheeze, cough, or visible cyanosis.    SKIN: Visible skin clear. No significant rash, abnormal pigmentation or lesions.  NEURO: Cranial nerves grossly intact.  Mentation and speech appropriate for age.  PSYCH: Appropriate affect, tone, and pace of words          Video-Visit Details    Type of service:  Video Visit     Originating Location (pt. Location): Home    Distant Location (provider location):  On-site  Platform used for Video Visit: [facility]  Signed Electronically by: Ej De Leon MD

## 2024-02-22 ENCOUNTER — MYC MEDICAL ADVICE (OUTPATIENT)
Dept: INTERNAL MEDICINE | Facility: CLINIC | Age: 54
End: 2024-02-22

## 2024-02-22 ENCOUNTER — HOSPITAL ENCOUNTER (OUTPATIENT)
Dept: CARDIOLOGY | Facility: CLINIC | Age: 54
Discharge: HOME | End: 2024-02-22
Attending: INTERNAL MEDICINE | Admitting: INTERNAL MEDICINE
Payer: COMMERCIAL

## 2024-02-22 DIAGNOSIS — R07.89 OTHER CHEST PAIN: Primary | ICD-10-CM

## 2024-02-22 DIAGNOSIS — R07.9 INTERMITTENT CHEST PAIN: ICD-10-CM

## 2024-02-22 PROCEDURE — 93018 CV STRESS TEST I&R ONLY: CPT | Performed by: STUDENT IN AN ORGANIZED HEALTH CARE EDUCATION/TRAINING PROGRAM

## 2024-02-22 PROCEDURE — 93325 DOPPLER ECHO COLOR FLOW MAPG: CPT | Mod: 26 | Performed by: STUDENT IN AN ORGANIZED HEALTH CARE EDUCATION/TRAINING PROGRAM

## 2024-02-22 PROCEDURE — 93016 CV STRESS TEST SUPVJ ONLY: CPT | Performed by: STUDENT IN AN ORGANIZED HEALTH CARE EDUCATION/TRAINING PROGRAM

## 2024-02-22 PROCEDURE — 93350 STRESS TTE ONLY: CPT | Mod: 26 | Performed by: STUDENT IN AN ORGANIZED HEALTH CARE EDUCATION/TRAINING PROGRAM

## 2024-02-22 PROCEDURE — 255N000002 HC RX 255 OP 636: Performed by: STUDENT IN AN ORGANIZED HEALTH CARE EDUCATION/TRAINING PROGRAM

## 2024-02-22 PROCEDURE — 93321 DOPPLER ECHO F-UP/LMTD STD: CPT | Mod: 26 | Performed by: STUDENT IN AN ORGANIZED HEALTH CARE EDUCATION/TRAINING PROGRAM

## 2024-02-22 PROCEDURE — 999N000208 ECHO STRESS ECHOCARDIOGRAM

## 2024-02-22 RX ADMIN — PERFLUTREN 5 ML: 6.52 INJECTION, SUSPENSION INTRAVENOUS at 08:12

## 2024-03-06 ENCOUNTER — MYC REFILL (OUTPATIENT)
Dept: INTERNAL MEDICINE | Facility: CLINIC | Age: 54
End: 2024-03-06
Payer: COMMERCIAL

## 2024-03-06 DIAGNOSIS — K22.710 BARRETT'S ESOPHAGUS WITH LOW GRADE DYSPLASIA: Primary | ICD-10-CM

## 2024-03-07 RX ORDER — OMEPRAZOLE 40 MG/1
40 CAPSULE, DELAYED RELEASE ORAL DAILY
Qty: 90 CAPSULE | Refills: 3 | Status: SHIPPED | OUTPATIENT
Start: 2024-03-07 | End: 2024-04-15

## 2024-03-13 ENCOUNTER — MYC MEDICAL ADVICE (OUTPATIENT)
Dept: INTERNAL MEDICINE | Facility: CLINIC | Age: 54
End: 2024-03-13

## 2024-03-13 ENCOUNTER — HOSPITAL ENCOUNTER (OUTPATIENT)
Dept: CARDIOLOGY | Facility: CLINIC | Age: 54
Discharge: HOME | End: 2024-03-13
Attending: INTERNAL MEDICINE | Admitting: INTERNAL MEDICINE
Payer: COMMERCIAL

## 2024-03-13 VITALS — SYSTOLIC BLOOD PRESSURE: 131 MMHG | DIASTOLIC BLOOD PRESSURE: 90 MMHG | HEART RATE: 60 BPM

## 2024-03-13 DIAGNOSIS — R93.1 ABNORMAL CARDIAC CT ANGIOGRAPHY: Primary | ICD-10-CM

## 2024-03-13 DIAGNOSIS — R07.89 OTHER CHEST PAIN: ICD-10-CM

## 2024-03-13 PROCEDURE — 75574 CT ANGIO HRT W/3D IMAGE: CPT | Mod: 26 | Performed by: INTERNAL MEDICINE

## 2024-03-13 PROCEDURE — 250N000013 HC RX MED GY IP 250 OP 250 PS 637: Performed by: INTERNAL MEDICINE

## 2024-03-13 PROCEDURE — 250N000009 HC RX 250: Performed by: INTERNAL MEDICINE

## 2024-03-13 PROCEDURE — 75574 CT ANGIO HRT W/3D IMAGE: CPT

## 2024-03-13 PROCEDURE — 250N000011 HC RX IP 250 OP 636: Performed by: INTERNAL MEDICINE

## 2024-03-13 RX ORDER — DILTIAZEM HCL 60 MG
120 TABLET ORAL
Status: DISCONTINUED | OUTPATIENT
Start: 2024-03-13 | End: 2024-03-14 | Stop reason: HOSPADM

## 2024-03-13 RX ORDER — DIPHENHYDRAMINE HYDROCHLORIDE 50 MG/ML
25-50 INJECTION INTRAMUSCULAR; INTRAVENOUS
Status: DISCONTINUED | OUTPATIENT
Start: 2024-03-13 | End: 2024-03-14 | Stop reason: HOSPADM

## 2024-03-13 RX ORDER — METOPROLOL TARTRATE 1 MG/ML
5-15 INJECTION, SOLUTION INTRAVENOUS
Status: DISCONTINUED | OUTPATIENT
Start: 2024-03-13 | End: 2024-03-14 | Stop reason: HOSPADM

## 2024-03-13 RX ORDER — ONDANSETRON 2 MG/ML
4 INJECTION INTRAMUSCULAR; INTRAVENOUS
Status: DISCONTINUED | OUTPATIENT
Start: 2024-03-13 | End: 2024-03-14 | Stop reason: HOSPADM

## 2024-03-13 RX ORDER — METHYLPREDNISOLONE SODIUM SUCCINATE 125 MG/2ML
125 INJECTION, POWDER, LYOPHILIZED, FOR SOLUTION INTRAMUSCULAR; INTRAVENOUS
Status: DISCONTINUED | OUTPATIENT
Start: 2024-03-13 | End: 2024-03-14 | Stop reason: HOSPADM

## 2024-03-13 RX ORDER — ACYCLOVIR 200 MG/1
0-1 CAPSULE ORAL
Status: DISCONTINUED | OUTPATIENT
Start: 2024-03-13 | End: 2024-03-14 | Stop reason: HOSPADM

## 2024-03-13 RX ORDER — DIPHENHYDRAMINE HCL 25 MG
25 CAPSULE ORAL
Status: DISCONTINUED | OUTPATIENT
Start: 2024-03-13 | End: 2024-03-14 | Stop reason: HOSPADM

## 2024-03-13 RX ORDER — IVABRADINE 5 MG/1
5-15 TABLET, FILM COATED ORAL
Status: COMPLETED | OUTPATIENT
Start: 2024-03-13 | End: 2024-03-13

## 2024-03-13 RX ORDER — IOPAMIDOL 755 MG/ML
500 INJECTION, SOLUTION INTRAVASCULAR ONCE
Status: COMPLETED | OUTPATIENT
Start: 2024-03-13 | End: 2024-03-13

## 2024-03-13 RX ORDER — NITROGLYCERIN 0.4 MG/1
0.4 TABLET SUBLINGUAL
Status: DISCONTINUED | OUTPATIENT
Start: 2024-03-13 | End: 2024-03-14 | Stop reason: HOSPADM

## 2024-03-13 RX ORDER — METOPROLOL TARTRATE 25 MG/1
25-100 TABLET, FILM COATED ORAL
Status: COMPLETED | OUTPATIENT
Start: 2024-03-13 | End: 2024-03-13

## 2024-03-13 RX ORDER — DILTIAZEM HYDROCHLORIDE 5 MG/ML
10-15 INJECTION INTRAVENOUS
Status: DISCONTINUED | OUTPATIENT
Start: 2024-03-13 | End: 2024-03-14 | Stop reason: HOSPADM

## 2024-03-13 RX ADMIN — IVABRADINE 10 MG: 5 TABLET, FILM COATED ORAL at 09:22

## 2024-03-13 RX ADMIN — IOPAMIDOL 120 ML: 755 INJECTION, SOLUTION INTRAVENOUS at 10:55

## 2024-03-13 RX ADMIN — METOPROLOL TARTRATE 10 MG: 5 INJECTION INTRAVENOUS at 10:23

## 2024-03-13 RX ADMIN — METOPROLOL TARTRATE 10 MG: 5 INJECTION INTRAVENOUS at 10:26

## 2024-03-13 RX ADMIN — METOPROLOL TARTRATE 10 MG: 5 INJECTION INTRAVENOUS at 10:33

## 2024-03-13 RX ADMIN — NITROGLYCERIN 0.4 MG: 0.4 TABLET SUBLINGUAL at 10:49

## 2024-03-13 RX ADMIN — METOPROLOL TARTRATE 10 MG: 5 INJECTION INTRAVENOUS at 10:29

## 2024-03-13 RX ADMIN — METOPROLOL TARTRATE 50 MG: 50 TABLET, FILM COATED ORAL at 09:22

## 2024-03-21 ENCOUNTER — MYC MEDICAL ADVICE (OUTPATIENT)
Dept: INTERNAL MEDICINE | Facility: CLINIC | Age: 54
End: 2024-03-21
Payer: COMMERCIAL

## 2024-03-22 ENCOUNTER — NURSE TRIAGE (OUTPATIENT)
Dept: INTERNAL MEDICINE | Facility: CLINIC | Age: 54
End: 2024-03-22

## 2024-03-22 DIAGNOSIS — U07.1 INFECTION DUE TO 2019 NOVEL CORONAVIRUS: Primary | ICD-10-CM

## 2024-03-22 NOTE — CONFIDENTIAL NOTE
RN COVID TREATMENT VISIT  03/22/24    The patient has been triaged and does not require a higher level of care.    Tariq Mccain  53 year old  Current weight? 207 lb    Has the patient been seen by a primary care provider at an [facility] or [facility] within the past two years? Yes.   Have you been in close proximity to/do you have a known exposure to a person with a confirmed case of influenza? No.     General treatment eligibility:  Date of positive COVID test (PCR or at home)?  3/19/24    Are you or have you been hospitalized for this COVID-19 infection? No.   Have you received monoclonal antibodies or antiviral treatment for COVID-19 since this positive test? No.   Do you have any of the following conditions that place you at risk of being very sick from COVID-19?   - Age 50 years or older  Yes, patient has at least one high risk condition as noted above.     Current COVID symptoms:   - fever or chills  - cough  - fatigue  - muscle or body aches  - sore throat  - congestion or runny nose  Yes. Patient has at least one symptom as selected.     How many days since symptoms started? 5 days or less. Established patient, 12 years or older weighing at least 88.2 lbs, who has symptoms that started in the past 5 days, has not been hospitalized nor received treatment already, and is at risk for being very sick from COVID-19.     Treatment eligibility by RN:  Are you currently pregnant or nursing? No  Do you have a clinically significant hypersensitivity to nirmatrelvir or ritonavir, or toxic epidermal necrolysis (TEN) or Crooks-Javi Syndrome? No  Do you have a history of hepatitis, any hepatic impairment on the Problem List (such as Child-Wagner Class C, cirrhosis, fatty liver disease, alcoholic liver disease), or was the last liver lab (hepatic panel, ALT, AST, ALK Phos, bilirubin) elevated in the past 6 months? No  Do you have any history of severe renal impairment (eGFR < 30mL/min)?  No    Is patient eligible to continue? Yes, patient meets all eligibility requirements for the RN COVID treatment (as denoted by all no responses above).     Current Outpatient Medications   Medication Sig Dispense Refill    ALPRAZolam (XANAX) 0.5 MG tablet TAKE 1 TABLET(0.5 MG) BY MOUTH DAILY AS NEEDED FOR ANXIETY 15 tablet 0    atorvastatin (LIPITOR) 20 MG tablet Take 1 tablet (20 mg) by mouth daily 90 tablet 3    citalopram (CELEXA) 20 MG tablet TAKE 1 TABLET(20 MG) BY MOUTH DAILY 90 tablet 0    levothyroxine (SYNTHROID/LEVOTHROID) 112 MCG tablet TAKE 1 TABLET(112 MCG) BY MOUTH DAILY 90 tablet 2    multivitamin, therapeutic (THERA-VIT) TABS tablet Take 1 tablet by mouth daily      omeprazole (PRILOSEC) 40 MG DR capsule Take 1 capsule (40 mg) by mouth daily 90 capsule 3    sildenafil (VIAGRA) 100 MG tablet Take 1 tablet (100 mg) by mouth daily as needed (erectile dysfunction) 30 tablet 1       Medications from List 1 of the standing order (on medications that exclude the use of Paxlovid) that patient is taking: NONE. Is patient taking Virgil's Wort? No  Is patient taking [place]'s Wort or any meds from List 1? No.   Medications from List 2 of the standing order (on meds that provider needs to adjust) that patient is taking: NONE. Is patient on any of the meds from List 2? No.   Medications from List 3 of standing order (on meds that a RN needs to adjust) that patient is taking: alprazolam (Xanax): Is patient taking as needed and less than daily? Yes, instructed patient to stop taking alprazolam while taking Paxlovid and restart alprazolam 3 days after the completion of Paxlovid.  atorvastatin (Lipitor): Instructed patient to stop atorvastatin while taking Paxlovid and restart atorvastatin 1 day after the completion of Paxlovid.  Is patient on any meds from List 3? Yes. Patient is on meds from list 3. No meds require a provider visit and at least one med required RN to adjust.     Paxlovid has an approximate 90%  reduction in hospitalization. Paxlovid can possibly cause altered sense of taste, diarrhea (loose, watery stools), high blood pressure, muscle aches.     Would patient like a Paxlovid prescription?   Yes.   Lab Results   Component Value Date    GFRESTIMATED 85 10/25/2023       Was last eGFR reduced? No, eGFR 60 or greater/ No Result on record. Patient can receive the normal renal function dose. Paxlovid Rx sent to [place] [facility]   [facility]    Temporary change to home medications: See above.    All medication adjustments (holds, etc) were discussed with the patient and patient was asked to repeat back (teachback) their med adjustment.  Did patient understand med adjustment? Yes, patient repeated back and understood correctly.        Reviewed the following instructions with the patient:    Paxlovid (nimatrelvir and ritonavir)    How it works  Two medicines (nirmatrelvir and ritonavir) are taken together. They stop the virus from growing. Less amount of virus is easier for your body to fight.    How to take  Medicine comes in a daily container with both medicine tablets. Take by mouth twice daily (once in the morning, once at night) for 5 days.  The number of tablets to take varies by patient.  Don't chew or break capsules. Swallow whole.    When to take  Take as soon as possible after positive COVID-19 test result, and within 5 days of your first symptoms.    Possible side effects  Can cause altered sense of taste, diarrhea (loose, watery stools), high blood pressure, muscle aches.    Trisha Dubose RN

## 2024-04-05 ENCOUNTER — OFFICE VISIT (OUTPATIENT)
Dept: CARDIOLOGY | Facility: CLINIC | Age: 54
End: 2024-04-05
Attending: INTERNAL MEDICINE
Payer: COMMERCIAL

## 2024-04-05 VITALS
HEIGHT: 68 IN | HEART RATE: 73 BPM | BODY MASS INDEX: 31.25 KG/M2 | SYSTOLIC BLOOD PRESSURE: 146 MMHG | WEIGHT: 206.2 LBS | DIASTOLIC BLOOD PRESSURE: 90 MMHG

## 2024-04-05 DIAGNOSIS — R93.1 ABNORMAL CARDIAC CT ANGIOGRAPHY: ICD-10-CM

## 2024-04-05 DIAGNOSIS — E78.2 MIXED HYPERLIPIDEMIA: ICD-10-CM

## 2024-04-05 DIAGNOSIS — R07.89 OTHER CHEST PAIN: Primary | ICD-10-CM

## 2024-04-05 DIAGNOSIS — R07.9 INTERMITTENT CHEST PAIN: ICD-10-CM

## 2024-04-05 PROCEDURE — 99205 OFFICE O/P NEW HI 60 MIN: CPT | Performed by: INTERNAL MEDICINE

## 2024-04-05 PROCEDURE — 93000 ELECTROCARDIOGRAM COMPLETE: CPT | Performed by: INTERNAL MEDICINE

## 2024-04-05 RX ORDER — NITROGLYCERIN 0.4 MG/1
TABLET SUBLINGUAL
Qty: 30 TABLET | Refills: 11 | Status: SHIPPED | OUTPATIENT
Start: 2024-04-05

## 2024-04-05 NOTE — PROGRESS NOTES
HPI and Plan:   Tariq Mccain is a 53 year old male who presents with history of chest pain he has an underlying history of hyperlipidemia, remote tobacco abuse.  He first noticed symptoms back in October when he was on a 2-hour hike.  He does note that it does not always happen with exertion, it can happen at rest as well and last for about 2 to 5 minutes at a time.  He was sent for a stress echocardiogram in February however he did not achieve target heart rate stopping due to chest pains.  He then underwent a CTA which demonstrated a severe stenosis in a ramus branch and mild to moderate proximal LAD disease.  He was referred to cardiology for further evaluation.  He was put on Lipitor 20 mg about 2 months ago for hyperlipidemia.  Back in October he had a cholesterol profile showing total 263 HDL 42  and triglycerides 149.  ECG today shows a normal sinus rhythm without acute ST or T wave abnormality.  Exam is unremarkable    Summary    1.  Unstable Angina with abnormal CTA indicating evidence of obstructive coronary artery disease-recommend coronary angiogram for further evaluation.Risks and benefits of left heart catheterization, aka coronary angiogram were discussed with the patient in detail. 0.1-0.3% (for diagnostic angio) and 1-2% (for PCI)  risk of stroke, MI, death, emergent bypass grafting, risk of contrast induced allergic reaction, renal dysfunction, vascular complications were discussed. Patient understands and wishes to proceed.  Recommend no heavy physical exertion until angiogram is performed.  Prescribed sublingual nitroglycerin with instruction today.  Recommend starting a daily baby aspirin.  Instructed not to take Viagra at this time.    2.  Hyperlipidemia-on moderate intensity atorvastatin, will very likely need a higher dose.  He is following up with his primary in a month or so to retest his levels.  Please feel free to contact me with any questions given regards to his care          Today's clinic visit entailed:  Review of external notes as documented elsewhere in note  Review of the result(s) of each unique test - CTA, Stress echo, lipids  Ordering of each unique test  Prescription drug management    Provider  Link to [facility] Help Grid     The level of medical decision making during this visit was of high complexity.    Orders Placed This Encounter   Procedures    EKG 12-lead complete w/read - Clinics (performed today)    Case Request Cath Lab: Coronary Angiogram     Orders Placed This Encounter   Medications    nitroGLYcerin (NITROSTAT) 0.4 MG sublingual tablet     Sig: For chest pain place 1 tablet under the tongue every 5 minutes for 3 doses. If symptoms persist 5 minutes after 1st dose call 911.     Dispense:  30 tablet     Refill:  11     There are no discontinued medications.      Encounter Diagnoses   Name Primary?    Abnormal cardiac CT angiography     Other chest pain Yes    Intermittent chest pain     Mixed hyperlipidemia        CURRENT MEDICATIONS:  Current Outpatient Medications   Medication Sig Dispense Refill    ALPRAZolam (XANAX) 0.5 MG tablet TAKE 1 TABLET(0.5 MG) BY MOUTH DAILY AS NEEDED FOR ANXIETY 15 tablet 0    atorvastatin (LIPITOR) 20 MG tablet Take 1 tablet (20 mg) by mouth daily 90 tablet 3    citalopram (CELEXA) 20 MG tablet TAKE 1 TABLET(20 MG) BY MOUTH DAILY 90 tablet 0    levothyroxine (SYNTHROID/LEVOTHROID) 112 MCG tablet TAKE 1 TABLET(112 MCG) BY MOUTH DAILY 90 tablet 2    multivitamin, therapeutic (THERA-VIT) TABS tablet Take 1 tablet by mouth daily      nitroGLYcerin (NITROSTAT) 0.4 MG sublingual tablet For chest pain place 1 tablet under the tongue every 5 minutes for 3 doses. If symptoms persist 5 minutes after 1st dose call 911. 30 tablet 11    omeprazole (PRILOSEC) 40 MG DR capsule Take 1 capsule (40 mg) by mouth daily 90 capsule 3    sildenafil (VIAGRA) 100 MG tablet Take 1 tablet (100 mg) by mouth daily as needed (erectile dysfunction) (Patient not  taking: Reported on 2024) 30 tablet 1       ALLERGIES     Allergies   Allergen Reactions    Cefuroxime Rash    Sertraline Rash    Simvastatin Rash and Muscle Pain (Myalgia)       PAST MEDICAL HISTORY:  Past Medical History:   Diagnosis Date    Anxiety state, unspecified     Depressive disorder        PAST SURGICAL HISTORY:  Past Surgical History:   Procedure Laterality Date    COLONOSCOPY      ENT SURGERY  1973       FAMILY HISTORY:  Family History   Problem Relation Age of Onset    Depression Mother     Anxiety Disorder Mother     Substance Abuse Father     Cancer Maternal Grandfather         skin     Thyroid Disease Maternal Grandfather     Diabetes Maternal Grandmother         type 2    Eye Disorder Maternal Grandmother         cataract    Anxiety Disorder Paternal Half-Brother        SOCIAL HISTORY:  Social History     Socioeconomic History    Marital status:      Spouse name: Arlette    Number of children: 1    Years of education: 14    Highest education level: None   Occupational History    Occupation: Store Manger   Tobacco Use    Smoking status: Former     Packs/day: 0.00     Years: 20.00     Additional pack years: 0.00     Total pack years: 0.00     Types: Cigarettes, Other     Quit date: 2021     Years since quittin.2    Smokeless tobacco: Never    Tobacco comments:     Still using E-cig   Vaping Use    Vaping Use: Some days    Substances: Nicotine    Devices: Refillable tank   Substance and Sexual Activity    Alcohol use: Not Currently     Comment: Very Occasional    Drug use: Not Currently     Comment: Have not used drugs in many years, but did when I was young.    Sexual activity: Yes     Partners: Female     Birth control/protection: Male Surgical   Other Topics Concern    Parent/sibling w/ CABG, MI or angioplasty before 65F 55M? No     Social Determinants of Health     Financial Resource Strain: Low Risk  (10/24/2023)    Financial Resource Strain     Within the past 12  "months, have you or your family members you live with been unable to get utilities (heat, electricity) when it was really needed?: No   Food Insecurity: Low Risk  (10/24/2023)    Food Insecurity     Within the past 12 months, did you worry that your food would run out before you got money to buy more?: No     Within the past 12 months, did the food you bought just not last and you didn t have money to get more?: No   Transportation Needs: Low Risk  (10/24/2023)    Transportation Needs     Within the past 12 months, has lack of transportation kept you from medical appointments, getting your medicines, non-medical meetings or appointments, work, or from getting things that you need?: No   Interpersonal Safety: Low Risk  (10/25/2023)    Interpersonal Safety     Do you feel physically and emotionally safe where you currently live?: Yes     Within the past 12 months, have you been hit, slapped, kicked or otherwise physically hurt by someone?: No     Within the past 12 months, have you been humiliated or emotionally abused in other ways by your partner or ex-partner?: No   Housing Stability: Low Risk  (10/24/2023)    Housing Stability     Do you have housing? : Yes     Are you worried about losing your housing?: No       Review of Systems:  Skin:        Eyes:       ENT:       Respiratory:  Negative    Cardiovascular:    Positive for;chest pain  Gastroenterology:      Genitourinary:       Musculoskeletal:       Neurologic:       Psychiatric:       Heme/Lymph/Imm:  Positive for allergies  Endocrine:  Positive for thyroid disorder    Physical Exam:  Vitals: BP (!) 146/90   Pulse 73   Ht 1.715 m (5' 7.5\")   Wt 93.5 kg (206 lb 3.2 oz)   BMI 31.82 kg/m      Constitutional:  cooperative;in no acute distress        Skin:  warm and dry to the touch          Head:  normocephalic        Eyes:  pupils equal and round        Lymph:      ENT:  no pallor or cyanosis        Neck:  no carotid bruit        Respiratory:  clear to " "auscultation;normal symmetry         Cardiac: regular rhythm;no murmurs, gallops or rubs detected                pulses full and equal                                        GI:  abdomen soft        Extremities and Muscular Skeletal:  no edema;no deformities, clubbing, cyanosis, erythema observed              Neurological:  no gross motor deficits;affect appropriate        Psych:  Alert and Oriented x 3        Recent Lab Results:  LIPID RESULTS:  Lab Results   Component Value Date    CHOL 263 (H) 10/25/2023    CHOL 247 (H) 07/08/2005    HDL 42 10/25/2023    HDL 44 07/08/2005     (H) 10/25/2023     (H) 07/08/2005    TRIG 149 10/25/2023    TRIG 79 07/08/2005    CHOLHDLRATIO 5.6 (H) 07/08/2005       LIVER ENZYME RESULTS:  Lab Results   Component Value Date    AST 27 10/25/2023    ALT 33 10/25/2023       CBC RESULTS:  No results found for: \"WBC\", \"RBC\", \"HGB\", \"HCT\", \"MCV\", \"MCH\", \"MCHC\", \"RDW\", \"PLT\"    BMP RESULTS:  Lab Results   Component Value Date     10/25/2023    POTASSIUM 4.9 10/25/2023    CHLORIDE 103 10/25/2023    CO2 25 10/25/2023    ANIONGAP 9 10/25/2023    GLC 91 10/25/2023    GLC 88 07/08/2005    BUN 21.2 (H) 10/25/2023    CR 1.04 10/25/2023    GFRESTIMATED 85 10/25/2023    CHONG 9.5 10/25/2023        A1C RESULTS:  No results found for: \"A1C\"    INR RESULTS:  No results found for: \"INR\"        CC  Ej De Leon MD  [address] [place] [address]                "

## 2024-04-05 NOTE — LETTER
4/5/2024    Ej De Leon MD  [address] [place] [address]    RE: Tariq Mccain       Dear Colleague,     I had the pleasure of seeing Tariq Mccain in the [facility].  HPI and Plan:   Tariq Mccain is a 53 year old male who presents with history of chest pain he has an underlying history of hyperlipidemia, remote tobacco abuse.  He first noticed symptoms back in October when he was on a 2-hour hike.  He does note that it does not always happen with exertion, it can happen at rest as well and last for about 2 to 5 minutes at a time.  He was sent for a stress echocardiogram in February however he did not achieve target heart rate stopping due to chest pains.  He then underwent a CTA which demonstrated a severe stenosis in a ramus branch and mild to moderate proximal LAD disease.  He was referred to cardiology for further evaluation.  He was put on Lipitor 20 mg about 2 months ago for hyperlipidemia.  Back in October he had a cholesterol profile showing total 263 HDL 42  and triglycerides 149.  ECG today shows a normal sinus rhythm without acute ST or T wave abnormality.  Exam is unremarkable    Summary    1.  Unstable Angina with abnormal CTA indicating evidence of obstructive coronary artery disease-recommend coronary angiogram for further evaluation.Risks and benefits of left heart catheterization, aka coronary angiogram were discussed with the patient in detail. 0.1-0.3% (for diagnostic angio) and 1-2% (for PCI)  risk of stroke, MI, death, emergent bypass grafting, risk of contrast induced allergic reaction, renal dysfunction, vascular complications were discussed. Patient understands and wishes to proceed.  Recommend no heavy physical exertion until angiogram is performed.  Prescribed sublingual nitroglycerin with instruction today.  Recommend starting a daily baby aspirin.  Instructed not to take Viagra at this time.    2.  Hyperlipidemia-on moderate  intensity atorvastatin, will very likely need a higher dose.  He is following up with his primary in a month or so to retest his levels.  Please feel free to contact me with any questions given regards to his care         Today's clinic visit entailed:  Review of external notes as documented elsewhere in note  Review of the result(s) of each unique test - CTA, Stress echo, lipids  Ordering of each unique test  Prescription drug management    Provider  Link to [facility] Help Grid     The level of medical decision making during this visit was of high complexity.    Orders Placed This Encounter   Procedures    EKG 12-lead complete w/read - Clinics (performed today)    Case Request Cath Lab: Coronary Angiogram     Orders Placed This Encounter   Medications    nitroGLYcerin (NITROSTAT) 0.4 MG sublingual tablet     Sig: For chest pain place 1 tablet under the tongue every 5 minutes for 3 doses. If symptoms persist 5 minutes after 1st dose call 911.     Dispense:  30 tablet     Refill:  11     There are no discontinued medications.      Encounter Diagnoses   Name Primary?    Abnormal cardiac CT angiography     Other chest pain Yes    Intermittent chest pain     Mixed hyperlipidemia        CURRENT MEDICATIONS:  Current Outpatient Medications   Medication Sig Dispense Refill    ALPRAZolam (XANAX) 0.5 MG tablet TAKE 1 TABLET(0.5 MG) BY MOUTH DAILY AS NEEDED FOR ANXIETY 15 tablet 0    atorvastatin (LIPITOR) 20 MG tablet Take 1 tablet (20 mg) by mouth daily 90 tablet 3    citalopram (CELEXA) 20 MG tablet TAKE 1 TABLET(20 MG) BY MOUTH DAILY 90 tablet 0    levothyroxine (SYNTHROID/LEVOTHROID) 112 MCG tablet TAKE 1 TABLET(112 MCG) BY MOUTH DAILY 90 tablet 2    multivitamin, therapeutic (THERA-VIT) TABS tablet Take 1 tablet by mouth daily      nitroGLYcerin (NITROSTAT) 0.4 MG sublingual tablet For chest pain place 1 tablet under the tongue every 5 minutes for 3 doses. If symptoms persist 5 minutes after 1st dose call 911. 30 tablet 11     omeprazole (PRILOSEC) 40 MG DR capsule Take 1 capsule (40 mg) by mouth daily 90 capsule 3    sildenafil (VIAGRA) 100 MG tablet Take 1 tablet (100 mg) by mouth daily as needed (erectile dysfunction) (Patient not taking: Reported on 2024) 30 tablet 1       ALLERGIES     Allergies   Allergen Reactions    Cefuroxime Rash    Sertraline Rash    Simvastatin Rash and Muscle Pain (Myalgia)       PAST MEDICAL HISTORY:  Past Medical History:   Diagnosis Date    Anxiety state, unspecified     Depressive disorder        PAST SURGICAL HISTORY:  Past Surgical History:   Procedure Laterality Date    COLONOSCOPY      ENT SURGERY  1973       FAMILY HISTORY:  Family History   Problem Relation Age of Onset    Depression Mother     Anxiety Disorder Mother     Substance Abuse Father     Cancer Maternal Grandfather         skin     Thyroid Disease Maternal Grandfather     Diabetes Maternal Grandmother         type 2    Eye Disorder Maternal Grandmother         cataract    Anxiety Disorder Paternal Half-Brother        SOCIAL HISTORY:  Social History     Socioeconomic History    Marital status:      Spouse name: Arlette    Number of children: 1    Years of education: 14    Highest education level: None   Occupational History    Occupation: Store Manger   Tobacco Use    Smoking status: Former     Packs/day: 0.00     Years: 20.00     Additional pack years: 0.00     Total pack years: 0.00     Types: Cigarettes, Other     Quit date: 2021     Years since quittin.2    Smokeless tobacco: Never    Tobacco comments:     Still using E-cig   Vaping Use    Vaping Use: Some days    Substances: Nicotine    Devices: Refillable tank   Substance and Sexual Activity    Alcohol use: Not Currently     Comment: Very Occasional    Drug use: Not Currently     Comment: Have not used drugs in many years, but did when I was young.    Sexual activity: Yes     Partners: Female     Birth control/protection: Male Surgical   Other Topics  "Concern    Parent/sibling w/ CABG, MI or angioplasty before 65F 55M? No     Social Determinants of Health     Financial Resource Strain: Low Risk  (10/24/2023)    Financial Resource Strain     Within the past 12 months, have you or your family members you live with been unable to get utilities (heat, electricity) when it was really needed?: No   Food Insecurity: Low Risk  (10/24/2023)    Food Insecurity     Within the past 12 months, did you worry that your food would run out before you got money to buy more?: No     Within the past 12 months, did the food you bought just not last and you didn t have money to get more?: No   Transportation Needs: Low Risk  (10/24/2023)    Transportation Needs     Within the past 12 months, has lack of transportation kept you from medical appointments, getting your medicines, non-medical meetings or appointments, work, or from getting things that you need?: No   Interpersonal Safety: Low Risk  (10/25/2023)    Interpersonal Safety     Do you feel physically and emotionally safe where you currently live?: Yes     Within the past 12 months, have you been hit, slapped, kicked or otherwise physically hurt by someone?: No     Within the past 12 months, have you been humiliated or emotionally abused in other ways by your partner or ex-partner?: No   Housing Stability: Low Risk  (10/24/2023)    Housing Stability     Do you have housing? : Yes     Are you worried about losing your housing?: No       Review of Systems:  Skin:        Eyes:       ENT:       Respiratory:  Negative    Cardiovascular:    Positive for;chest pain  Gastroenterology:      Genitourinary:       Musculoskeletal:       Neurologic:       Psychiatric:       Heme/Lymph/Imm:  Positive for allergies  Endocrine:  Positive for thyroid disorder    Physical Exam:  Vitals: BP (!) 146/90   Pulse 73   Ht 1.715 m (5' 7.5\")   Wt 93.5 kg (206 lb 3.2 oz)   BMI 31.82 kg/m      Constitutional:  cooperative;in no acute distress    " "    Skin:  warm and dry to the touch          Head:  normocephalic        Eyes:  pupils equal and round        Lymph:      ENT:  no pallor or cyanosis        Neck:  no carotid bruit        Respiratory:  clear to auscultation;normal symmetry         Cardiac: regular rhythm;no murmurs, gallops or rubs detected                pulses full and equal                                        GI:  abdomen soft        Extremities and Muscular Skeletal:  no edema;no deformities, clubbing, cyanosis, erythema observed              Neurological:  no gross motor deficits;affect appropriate        Psych:  Alert and Oriented x 3        Recent Lab Results:  LIPID RESULTS:  Lab Results   Component Value Date    CHOL 263 (H) 10/25/2023    CHOL 247 (H) 07/08/2005    HDL 42 10/25/2023    HDL 44 07/08/2005     (H) 10/25/2023     (H) 07/08/2005    TRIG 149 10/25/2023    TRIG 79 07/08/2005    CHOLHDLRATIO 5.6 (H) 07/08/2005       LIVER ENZYME RESULTS:  Lab Results   Component Value Date    AST 27 10/25/2023    ALT 33 10/25/2023       CBC RESULTS:  No results found for: \"WBC\", \"RBC\", \"HGB\", \"HCT\", \"MCV\", \"MCH\", \"MCHC\", \"RDW\", \"PLT\"    BMP RESULTS:  Lab Results   Component Value Date     10/25/2023    POTASSIUM 4.9 10/25/2023    CHLORIDE 103 10/25/2023    CO2 25 10/25/2023    ANIONGAP 9 10/25/2023    GLC 91 10/25/2023    GLC 88 07/08/2005    BUN 21.2 (H) 10/25/2023    CR 1.04 10/25/2023    GFRESTIMATED 85 10/25/2023    CHONG 9.5 10/25/2023        A1C RESULTS:  No results found for: \"A1C\"    INR RESULTS:  No results found for: \"INR\"        CC  Ej De Leon MD  [address] [place] [address]        Thank you for allowing me to participate in the care of your patient.      Sincerely,     Any Mon [facility]  "

## 2024-04-08 DIAGNOSIS — R93.1 ABNORMAL CARDIAC CT ANGIOGRAPHY: Primary | ICD-10-CM

## 2024-04-08 RX ORDER — ASPIRIN 81 MG/1
243 TABLET, CHEWABLE ORAL ONCE
Status: CANCELLED | OUTPATIENT
Start: 2024-04-08

## 2024-04-08 RX ORDER — ASPIRIN 325 MG
325 TABLET ORAL ONCE
Status: CANCELLED | OUTPATIENT
Start: 2024-04-08 | End: 2024-04-08

## 2024-04-08 RX ORDER — SODIUM CHLORIDE 9 MG/ML
INJECTION, SOLUTION INTRAVENOUS CONTINUOUS
Status: CANCELLED | OUTPATIENT
Start: 2024-04-08

## 2024-04-08 RX ORDER — LIDOCAINE 40 MG/G
CREAM TOPICAL
Status: CANCELLED | OUTPATIENT
Start: 2024-04-08

## 2024-04-08 NOTE — PROGRESS NOTES
Coronary angiogram/PCI/Right Heart Cath prep instructions.     Patient is scheduled for a Coronary Angiogram at [facility] - [address], [place] [address] - Main Entrance of the Hospital on 4/9/24.  Check in time is at 0830 and procedure to follow.    Patient instructed to remain NPO for solid foods 8 hours prior to arrival and may have clear liquids up to 2 hours prior to arrival.    Patient Patient does not require extra fluids prior to procedure.    Patient is not diabetic.    Patient is not on anticoagulation.    Patient is not on diuretics.     Patient is not currently taking ASA and has been advised to take one 325 mg tablet the day prior and morning of the procedure.    Pt is not on a SGLT2 inhibitor.    Pt is not on a GLP-1 Agonist    Patient advised to take their other daily medications the morning of the procedure with small sips of water.     Verified patient does not have a contrast allergy.    Verified patient has someone available to drive them home from the hospital and can stay with them for 24 hours after the procedure.     Patient advised to notify care team with any new COVID like symptoms prior to procedure. Day of procedure phone number: Vivek at [phone]    Patient is aware of visitor policy.    Patient expresses understanding of above instructions and denies further questions at this time.      Suri Quiñones RN  [facility]

## 2024-04-09 ENCOUNTER — HOSPITAL ENCOUNTER (OUTPATIENT)
Facility: CLINIC | Age: 54
Discharge: HOME | End: 2024-04-09
Admitting: INTERNAL MEDICINE
Payer: COMMERCIAL

## 2024-04-09 VITALS
HEIGHT: 68 IN | OXYGEN SATURATION: 97 % | DIASTOLIC BLOOD PRESSURE: 87 MMHG | RESPIRATION RATE: 16 BRPM | WEIGHT: 204 LBS | BODY MASS INDEX: 30.92 KG/M2 | HEART RATE: 81 BPM | TEMPERATURE: 98.3 F | SYSTOLIC BLOOD PRESSURE: 116 MMHG

## 2024-04-09 DIAGNOSIS — E78.2 MIXED HYPERLIPIDEMIA: ICD-10-CM

## 2024-04-09 DIAGNOSIS — Z95.5 S/P DRUG ELUTING CORONARY STENT PLACEMENT: ICD-10-CM

## 2024-04-09 DIAGNOSIS — I25.118 CORONARY ARTERY DISEASE OF NATIVE ARTERY OF NATIVE HEART WITH STABLE ANGINA PECTORIS (H): Primary | ICD-10-CM

## 2024-04-09 DIAGNOSIS — R93.1 ABNORMAL CARDIAC CT ANGIOGRAPHY: ICD-10-CM

## 2024-04-09 DIAGNOSIS — R07.9 INTERMITTENT CHEST PAIN: ICD-10-CM

## 2024-04-09 DIAGNOSIS — R07.89 OTHER CHEST PAIN: ICD-10-CM

## 2024-04-09 PROBLEM — Z98.890 STATUS POST CORONARY ANGIOGRAM: Status: ACTIVE | Noted: 2024-04-09

## 2024-04-09 LAB
ANION GAP SERPL CALCULATED.3IONS-SCNC: 10 MMOL/L (ref 7–15)
APTT PPP: 25 SECONDS (ref 22–38)
ATRIAL RATE - MUSE: 76 BPM
BUN SERPL-MCNC: 19.4 MG/DL (ref 6–20)
CALCIUM SERPL-MCNC: 9.3 MG/DL (ref 8.6–10)
CHLORIDE SERPL-SCNC: 103 MMOL/L (ref 98–107)
CHOLEST SERPL-MCNC: 240 MG/DL
CREAT SERPL-MCNC: 1.08 MG/DL (ref 0.67–1.17)
DEPRECATED HCO3 PLAS-SCNC: 27 MMOL/L (ref 22–29)
DIASTOLIC BLOOD PRESSURE - MUSE: NORMAL MMHG
EGFRCR SERPLBLD CKD-EPI 2021: 81 ML/MIN/1.73M2
ERYTHROCYTE [DISTWIDTH] IN BLOOD BY AUTOMATED COUNT: 13.1 % (ref 10–15)
FASTING STATUS PATIENT QL REPORTED: YES
GLUCOSE SERPL-MCNC: 110 MG/DL (ref 70–99)
HCT VFR BLD AUTO: 43.5 % (ref 40–53)
HDLC SERPL-MCNC: 41 MG/DL
HGB BLD-MCNC: 15 G/DL (ref 13.3–17.7)
INR PPP: 0.94 (ref 0.85–1.15)
INTERPRETATION ECG - MUSE: NORMAL
LDLC SERPL CALC-MCNC: 191 MG/DL
MCH RBC QN AUTO: 30.2 PG (ref 26.5–33)
MCHC RBC AUTO-ENTMCNC: 34.5 G/DL (ref 31.5–36.5)
MCV RBC AUTO: 88 FL (ref 78–100)
NONHDLC SERPL-MCNC: 199 MG/DL
P AXIS - MUSE: 60 DEGREES
PLATELET # BLD AUTO: 228 10E3/UL (ref 150–450)
POTASSIUM SERPL-SCNC: 4.7 MMOL/L (ref 3.4–5.3)
PR INTERVAL - MUSE: 174 MS
QRS DURATION - MUSE: 84 MS
QT - MUSE: 380 MS
QTC - MUSE: 427 MS
R AXIS - MUSE: 61 DEGREES
RBC # BLD AUTO: 4.97 10E6/UL (ref 4.4–5.9)
SODIUM SERPL-SCNC: 140 MMOL/L (ref 135–145)
SYSTOLIC BLOOD PRESSURE - MUSE: NORMAL MMHG
T AXIS - MUSE: 68 DEGREES
TRIGL SERPL-MCNC: 42 MG/DL
VENTRICULAR RATE- MUSE: 76 BPM
WBC # BLD AUTO: 4.8 10E3/UL (ref 4–11)

## 2024-04-09 PROCEDURE — 92928 PRQ TCAT PLMT NTRAC ST 1 LES: CPT | Mod: RI | Performed by: INTERNAL MEDICINE

## 2024-04-09 PROCEDURE — 85610 PROTHROMBIN TIME: CPT | Performed by: INTERNAL MEDICINE

## 2024-04-09 PROCEDURE — 99152 MOD SED SAME PHYS/QHP 5/>YRS: CPT | Performed by: INTERNAL MEDICINE

## 2024-04-09 PROCEDURE — C1769 GUIDE WIRE: HCPCS | Performed by: INTERNAL MEDICINE

## 2024-04-09 PROCEDURE — 272N000001 HC OR GENERAL SUPPLY STERILE: Performed by: INTERNAL MEDICINE

## 2024-04-09 PROCEDURE — C1894 INTRO/SHEATH, NON-LASER: HCPCS | Performed by: INTERNAL MEDICINE

## 2024-04-09 PROCEDURE — 999N000184 HC STATISTIC TELEMETRY

## 2024-04-09 PROCEDURE — 93458 L HRT ARTERY/VENTRICLE ANGIO: CPT | Mod: 26 | Performed by: INTERNAL MEDICINE

## 2024-04-09 PROCEDURE — 99153 MOD SED SAME PHYS/QHP EA: CPT | Performed by: INTERNAL MEDICINE

## 2024-04-09 PROCEDURE — 258N000003 HC RX IP 258 OP 636: Performed by: INTERNAL MEDICINE

## 2024-04-09 PROCEDURE — 85730 THROMBOPLASTIN TIME PARTIAL: CPT | Performed by: INTERNAL MEDICINE

## 2024-04-09 PROCEDURE — 80061 LIPID PANEL: CPT | Performed by: INTERNAL MEDICINE

## 2024-04-09 PROCEDURE — C1874 STENT, COATED/COV W/DEL SYS: HCPCS | Performed by: INTERNAL MEDICINE

## 2024-04-09 PROCEDURE — C1725 CATH, TRANSLUMIN NON-LASER: HCPCS | Performed by: INTERNAL MEDICINE

## 2024-04-09 PROCEDURE — 999N000054 HC STATISTIC EKG NON-CHARGEABLE

## 2024-04-09 PROCEDURE — C1887 CATHETER, GUIDING: HCPCS | Performed by: INTERNAL MEDICINE

## 2024-04-09 PROCEDURE — 250N000011 HC RX IP 250 OP 636: Performed by: INTERNAL MEDICINE

## 2024-04-09 PROCEDURE — 93458 L HRT ARTERY/VENTRICLE ANGIO: CPT | Performed by: INTERNAL MEDICINE

## 2024-04-09 PROCEDURE — C9600 PERC DRUG-EL COR STENT SING: HCPCS | Performed by: INTERNAL MEDICINE

## 2024-04-09 PROCEDURE — 36415 COLL VENOUS BLD VENIPUNCTURE: CPT | Performed by: INTERNAL MEDICINE

## 2024-04-09 PROCEDURE — 93010 ELECTROCARDIOGRAM REPORT: CPT | Mod: XU | Performed by: INTERNAL MEDICINE

## 2024-04-09 PROCEDURE — 250N000009 HC RX 250: Performed by: INTERNAL MEDICINE

## 2024-04-09 PROCEDURE — 85347 COAGULATION TIME ACTIVATED: CPT

## 2024-04-09 PROCEDURE — 250N000013 HC RX MED GY IP 250 OP 250 PS 637: Performed by: INTERNAL MEDICINE

## 2024-04-09 PROCEDURE — 93005 ELECTROCARDIOGRAM TRACING: CPT

## 2024-04-09 PROCEDURE — 85027 COMPLETE CBC AUTOMATED: CPT | Performed by: INTERNAL MEDICINE

## 2024-04-09 PROCEDURE — 82374 ASSAY BLOOD CARBON DIOXIDE: CPT | Performed by: INTERNAL MEDICINE

## 2024-04-09 PROCEDURE — 999N000071 HC STATISTIC HEART CATH LAB OR EP LAB

## 2024-04-09 DEVICE — STENT CORONARY DES SYNERGY XD MR US 3.00X12MM H7493941812300: Type: IMPLANTABLE DEVICE | Status: FUNCTIONAL

## 2024-04-09 RX ORDER — NITROGLYCERIN 0.4 MG/1
0.4 TABLET SUBLINGUAL EVERY 5 MIN PRN
Status: DISCONTINUED | OUTPATIENT
Start: 2024-04-09 | End: 2024-04-09 | Stop reason: HOSPADM

## 2024-04-09 RX ORDER — ASPIRIN 81 MG/1
81 TABLET ORAL DAILY
Status: DISCONTINUED | OUTPATIENT
Start: 2024-04-10 | End: 2024-04-09 | Stop reason: HOSPADM

## 2024-04-09 RX ORDER — ACETAMINOPHEN 325 MG/1
650 TABLET ORAL EVERY 4 HOURS PRN
Status: DISCONTINUED | OUTPATIENT
Start: 2024-04-09 | End: 2024-04-09 | Stop reason: HOSPADM

## 2024-04-09 RX ORDER — LIDOCAINE 40 MG/G
CREAM TOPICAL
Status: DISCONTINUED | OUTPATIENT
Start: 2024-04-09 | End: 2024-04-09 | Stop reason: HOSPADM

## 2024-04-09 RX ORDER — METOPROLOL TARTRATE 1 MG/ML
5 INJECTION, SOLUTION INTRAVENOUS
Status: DISCONTINUED | OUTPATIENT
Start: 2024-04-09 | End: 2024-04-09 | Stop reason: HOSPADM

## 2024-04-09 RX ORDER — ASPIRIN 81 MG/1
243 TABLET, CHEWABLE ORAL ONCE
Status: COMPLETED | OUTPATIENT
Start: 2024-04-09 | End: 2024-04-09

## 2024-04-09 RX ORDER — SODIUM CHLORIDE 9 MG/ML
INJECTION, SOLUTION INTRAVENOUS CONTINUOUS
Status: DISCONTINUED | OUTPATIENT
Start: 2024-04-09 | End: 2024-04-09 | Stop reason: HOSPADM

## 2024-04-09 RX ORDER — ASPIRIN 325 MG
325 TABLET ORAL ONCE
Status: COMPLETED | OUTPATIENT
Start: 2024-04-09 | End: 2024-04-09

## 2024-04-09 RX ORDER — NALOXONE HYDROCHLORIDE 0.4 MG/ML
0.4 INJECTION, SOLUTION INTRAMUSCULAR; INTRAVENOUS; SUBCUTANEOUS
Status: DISCONTINUED | OUTPATIENT
Start: 2024-04-09 | End: 2024-04-09 | Stop reason: HOSPADM

## 2024-04-09 RX ORDER — NALOXONE HYDROCHLORIDE 0.4 MG/ML
0.2 INJECTION, SOLUTION INTRAMUSCULAR; INTRAVENOUS; SUBCUTANEOUS
Status: DISCONTINUED | OUTPATIENT
Start: 2024-04-09 | End: 2024-04-09 | Stop reason: HOSPADM

## 2024-04-09 RX ORDER — VERAPAMIL HYDROCHLORIDE 2.5 MG/ML
INJECTION, SOLUTION INTRAVENOUS
Status: DISCONTINUED | OUTPATIENT
Start: 2024-04-09 | End: 2024-04-09 | Stop reason: HOSPADM

## 2024-04-09 RX ORDER — ONDANSETRON 4 MG/1
4 TABLET, ORALLY DISINTEGRATING ORAL EVERY 6 HOURS PRN
Status: DISCONTINUED | OUTPATIENT
Start: 2024-04-09 | End: 2024-04-09 | Stop reason: HOSPADM

## 2024-04-09 RX ORDER — HEPARIN SODIUM 1000 [USP'U]/ML
INJECTION, SOLUTION INTRAVENOUS; SUBCUTANEOUS
Status: DISCONTINUED | OUTPATIENT
Start: 2024-04-09 | End: 2024-04-09 | Stop reason: HOSPADM

## 2024-04-09 RX ORDER — NITROGLYCERIN 5 MG/ML
VIAL (ML) INTRAVENOUS
Status: DISCONTINUED | OUTPATIENT
Start: 2024-04-09 | End: 2024-04-09 | Stop reason: HOSPADM

## 2024-04-09 RX ORDER — FLUMAZENIL 0.1 MG/ML
0.2 INJECTION, SOLUTION INTRAVENOUS
Status: DISCONTINUED | OUTPATIENT
Start: 2024-04-09 | End: 2024-04-09 | Stop reason: HOSPADM

## 2024-04-09 RX ORDER — CLOPIDOGREL BISULFATE 75 MG/1
75 TABLET ORAL DAILY
Status: DISCONTINUED | OUTPATIENT
Start: 2024-04-10 | End: 2024-04-09 | Stop reason: HOSPADM

## 2024-04-09 RX ORDER — ONDANSETRON 2 MG/ML
4 INJECTION INTRAMUSCULAR; INTRAVENOUS EVERY 6 HOURS PRN
Status: DISCONTINUED | OUTPATIENT
Start: 2024-04-09 | End: 2024-04-09 | Stop reason: HOSPADM

## 2024-04-09 RX ORDER — IOPAMIDOL 755 MG/ML
INJECTION, SOLUTION INTRAVASCULAR
Status: DISCONTINUED | OUTPATIENT
Start: 2024-04-09 | End: 2024-04-09 | Stop reason: HOSPADM

## 2024-04-09 RX ORDER — FENTANYL CITRATE 50 UG/ML
25 INJECTION, SOLUTION INTRAMUSCULAR; INTRAVENOUS
Status: DISCONTINUED | OUTPATIENT
Start: 2024-04-09 | End: 2024-04-09 | Stop reason: HOSPADM

## 2024-04-09 RX ORDER — FENTANYL CITRATE 50 UG/ML
INJECTION, SOLUTION INTRAMUSCULAR; INTRAVENOUS
Status: DISCONTINUED | OUTPATIENT
Start: 2024-04-09 | End: 2024-04-09 | Stop reason: HOSPADM

## 2024-04-09 RX ORDER — CLOPIDOGREL BISULFATE 75 MG/1
75 TABLET ORAL DAILY
Qty: 90 TABLET | Refills: 3 | Status: SHIPPED | OUTPATIENT
Start: 2024-04-10 | End: 2024-07-02

## 2024-04-09 RX ORDER — ASPIRIN 81 MG/1
81 TABLET, CHEWABLE ORAL DAILY
COMMUNITY
Start: 2024-04-10 | End: 2024-05-15

## 2024-04-09 RX ORDER — CLOPIDOGREL BISULFATE 75 MG/1
TABLET ORAL
Status: DISCONTINUED | OUTPATIENT
Start: 2024-04-09 | End: 2024-04-09 | Stop reason: HOSPADM

## 2024-04-09 RX ORDER — HYDRALAZINE HYDROCHLORIDE 20 MG/ML
10 INJECTION INTRAMUSCULAR; INTRAVENOUS EVERY 4 HOURS PRN
Status: DISCONTINUED | OUTPATIENT
Start: 2024-04-09 | End: 2024-04-09 | Stop reason: HOSPADM

## 2024-04-09 RX ORDER — ASPIRIN 81 MG/1
81 TABLET, CHEWABLE ORAL ONCE
Status: COMPLETED | OUTPATIENT
Start: 2024-04-09 | End: 2024-04-09

## 2024-04-09 RX ORDER — ATROPINE SULFATE 0.1 MG/ML
0.5 INJECTION INTRAVENOUS
Status: DISCONTINUED | OUTPATIENT
Start: 2024-04-09 | End: 2024-04-09 | Stop reason: HOSPADM

## 2024-04-09 RX ADMIN — SODIUM CHLORIDE: 9 INJECTION, SOLUTION INTRAVENOUS at 09:12

## 2024-04-09 ASSESSMENT — ACTIVITIES OF DAILY LIVING (ADL)
ADLS_ACUITY_SCORE: 35

## 2024-04-09 NOTE — Clinical Note
The first balloon was inserted into the ramus.Max pressure = 6 grupo. Total duration = 10 seconds.     Max pressure = 12 grupo. Total duration = 20 seconds.    Balloon reinflated a second time: Max pressure = 12 grupo. Total duration = 20 seconds.

## 2024-04-09 NOTE — PROGRESS NOTES
Care Suites Admission Nursing Note    Patient Information  Name: Tariq Mccain  Age: 53 year old  Reason for admission: Coronary angiogram  Care Suites arrival time: 8:45 am    Visitor Information  Name: Arlette     Patient Admission/Assessment   Pre-procedure assessment complete: Yes  If abnormal assessment/labs, provider notified: N/A  NPO: Yes  Medications held per instructions/orders: Yes  Consent: obtained  Patient oriented to room: Yes  Education/questions answered: Yes  Plan/other: To Cath lab at approximately 10:30 am    Discharge Planning  Discharge name/phone number: Arlette [phone]  Overnight post sedation caregiver: Arlette  Discharge location: home

## 2024-04-09 NOTE — PROGRESS NOTES
Care Suites Post Procedure Note    Patient Information  Name: Tariq Mccain  Age: 53 year old    Post Procedure  Time patient returned to Care Suites: 1240  Concerns/abnormal assessment: none  If abnormal assessment, provider notified: N/A  Plan/Other: TR band on right wrist 10 ml's air, wife at the bedside    Claribel Medley RN

## 2024-04-09 NOTE — PRE-PROCEDURE
GENERAL PRE-PROCEDURE:   Procedure:  Coronary angiogram with possible PCI  Date/Time:  4/9/2024 11:55 AM    Written consent obtained?: Yes    Risks and benefits: Risks, benefits and alternatives were discussed    Consent given by:  Patient  Patient states understanding of procedure being performed: Yes    Patient's understanding of procedure matches consent: Yes    Procedure consent matches procedure scheduled: Yes    Expected level of sedation:  Moderate  Appropriately NPO:  Yes  ASA Class:  4  Mallampati  :  Grade 1- soft palate, uvula, tonsillar pillars, and posterior pharyngeal wall visible  Lungs:  Lungs clear with good breath sounds bilaterally  Heart:  Normal heart sounds and rate  History & Physical reviewed:  History and physical reviewed and no updates needed  Statement of review:  I have reviewed the lab findings, diagnostic data, medications, and the plan for sedation

## 2024-04-09 NOTE — PROGRESS NOTES
Care Suites Discharge Nursing Note    Patient Information  Name: Tariq Mccain  Age: 53 year old    Discharge Education:  Discharge instructions reviewed: Yes by SHORTY Everett  Additional education/resources provided: Stent card given to patient  Patient/patient representative verbalizes understanding: Yes  Patient discharging on new medications: Yes  Medication education completed: Yes by SHORTY Everett    Discharge Plans:   Discharge location: home  Discharge ride contacted: Yes  Approximate discharge time: 1711    Discharge Criteria:  Discharge criteria met and vital signs stable: Yes    Patient Belongs:  Patient belongings returned to patient: Yes    Thao Guerrero RN

## 2024-04-09 NOTE — Clinical Note
Hemodynamic equipment used: 5 lead ECG, [employer] With 3 Leads, Machine BP Cuff and pulse oximeter probe.

## 2024-04-09 NOTE — DISCHARGE INSTRUCTIONS
Cardiac Angioplasty/Stent Discharge Instructions - Radial    After you go home:    Have an adult stay with you until tomorrow.  Drink extra fluids for 2 days.  You may resume your normal diet.  No smoking       For 24 hours - due to the sedation you received:  Relax and take it easy.  Do NOT make any important or legal decisions.  Do NOT drive or operate machines at home or at work.  Do NOT drink alcohol.    Care of Wrist Puncture Site:    For the first 24 hrs - check the puncture site every 1-2 hours while awake.  It is normal to have soreness at the puncture site and mild tingling in your hand for up to 3 days.  Remove the bandaid after 24 hours. If there is minor oozing, apply another bandaid and remove it after 12 hours.  You may shower tomorrow.  Do NOT take a bath, or use a hot tub or pool for at least 3 days. Do NOT scrub the site. Do not use lotion or powder near the puncture site.           Activity:          For 2 days:   do not use your hand or arm to support your weight (such as rising from a chair)   do not bend your wrist (such as lifting a garage door).  do not lift more than 5 pounds or exercise your arm (such as tennis, golf or bowling).  Do NOT do any heavy activity such as exercise, lifting, or straining.     Bleeding:    If you start bleeding from the site in your wrist, sit down and press firmly on/above the site for 10 minutes.   Once bleeding stops, keep arm still for 2 hours.   Call [facility] as soon as you can.       Call 911 right away if you have heavy bleeding or bleeding that does not stop.      Medicines:    If you are taking an antiplatelet medication such as Plavix, Brilinta or Effient, do not stop taking it until you talk to your cardiologist.      If you are on Metformin (Glucophage), do not restart it until you have blood tests (within 2 to 3 days after discharge).  After you have your blood drawn, you may restart the Metformin.   Take your medications, including blood thinners,  unless your provider tells you not to.    If you take Coumadin (Warfarin), have your INR checked by your provider in  3-5 days. Call your clinic to schedule this.  If you have stopped any medicines, check with your provider about when to restart them.    Follow Up Appointments:    Follow up with [facility] Nurse Practitioner at [facility] of patient preference in 7-10 days.  Cardiac Rehab will contact you for follow up care.    Call the clinic if:    You have a large or growing hard lump around the site.  The site is red, swollen, hot or tender.  Blood or fluid is draining from the site.  You have chills or a fever greater than 101 F (38 C).  Your arm feels numb, cool or changes color.  You have hives, a rash or unusual itching.  Any questions or concerns.    Other Instructions:    If you received a stent - carry your stent card with you at all times.      [facility] [place]:    [phone] [facility] (7 days a week)

## 2024-04-10 ENCOUNTER — TELEPHONE (OUTPATIENT)
Dept: CARDIOLOGY | Facility: CLINIC | Age: 54
End: 2024-04-10
Payer: COMMERCIAL

## 2024-04-10 LAB
ACT BLD: 259 SECONDS (ref 74–150)
ACT BLD: 360 SECONDS (ref 74–150)

## 2024-04-10 NOTE — TELEPHONE ENCOUNTER
"Patient was admitted to [facility] on 4/9/24 with HTN and he is an active cigarette smoker with accelerating exertional angina and severe stenosis of ramus intermedius noted on coronary CT angiogram was referred for coronary angiogram with possible PCI.     4/9/24: Coronary angiogram via RRA resulted in:      Ramus lesion is 90% stenosed.    1st Diag lesion is 50% stenosed.    Mid LAD lesion is 25% stenosed.      Severe stenosis of the ramus intermedius.  Moderate stenosis of the ostial first diagonal branch of the LAD.  Successful PCI of the ramus with placement of a 3.0 x 12 mm Synergy DAVINA.     Pt was started on ASA and Plavix. PTA NTG was continued.    Called patient to discuss any post hospital d/c questions, review medication changes, and confirm f/u appts. Patient denied any questions regarding new medications or changes to PTA medications.     RN confirmed with patient that he was d/c with an adequate supply of the antiplatelet Plavix, and reminded of importance of taking without interruption.     Pt asking about interaction between Omeprazole and Plavix. States he needs to take Omeprazole daily for acid reflux and had heard it may interact with Plavix. Writer reviewed Micromedex for any drug/drug interaction, and showed a SEVERE reaction as below;    \"Concurrent use of CLOPIDOGREL and OMEPRAZOLE may result in reduced clopidogrel's active metabolite exposure and reduced antiplatelet activity. \"    Will route this note to Dr. Mon and her Team SHORTY Mccord for further review, recommendations and pt follow up. Pt provided Team RN phone number as well.    Pt has an Rx for PRN SL Nitroglycerin.     Patient denied any SOB, chest pain or light headedness.    RRA cardiac cath site is without bleeding, swelling, redness or signs of infection.     RN confirmed with patient that he is scheduled for an OV on 4/23/24 at 0935 with Dr. Mon at our [facility] [facility].    Cardiac rehab is scheduled 4/23/24 at 1245 at [facility].    Patient " advised to call clinic with any cardiac related questions or concerns prior to this pebbles't. Patient verbalized understanding and agreed with plan. DANTE Mays RN.

## 2024-04-10 NOTE — TELEPHONE ENCOUNTER
Please advise on medication interaction.  Patient is s/p DAVIAN 4/9/24.    Concurrent use of CLOPIDOGREL and OMEPRAZOLE may result in reduced clopidogrel's active metabolite exposure and reduced antiplatelet activity     Patient uses omeprazole daily for acid refulx.    Suri Quiñones RN on 4/10/2024 at 4:46 PM

## 2024-04-12 ENCOUNTER — MYC MEDICAL ADVICE (OUTPATIENT)
Dept: INTERNAL MEDICINE | Facility: CLINIC | Age: 54
End: 2024-04-12
Payer: COMMERCIAL

## 2024-04-12 DIAGNOSIS — K22.710 BARRETT'S ESOPHAGUS WITH LOW GRADE DYSPLASIA: Primary | ICD-10-CM

## 2024-04-12 DIAGNOSIS — F33.42 MAJOR DEPRESSIVE DISORDER, RECURRENT EPISODE, IN FULL REMISSION (H): ICD-10-CM

## 2024-04-12 LAB
ATRIAL RATE - MUSE: 74 BPM
DIASTOLIC BLOOD PRESSURE - MUSE: NORMAL MMHG
INTERPRETATION ECG - MUSE: NORMAL
P AXIS - MUSE: 51 DEGREES
PR INTERVAL - MUSE: 194 MS
QRS DURATION - MUSE: 84 MS
QT - MUSE: 406 MS
QTC - MUSE: 450 MS
R AXIS - MUSE: 60 DEGREES
SYSTOLIC BLOOD PRESSURE - MUSE: NORMAL MMHG
T AXIS - MUSE: 50 DEGREES
VENTRICULAR RATE- MUSE: 74 BPM

## 2024-04-12 RX ORDER — CITALOPRAM HYDROBROMIDE 20 MG/1
20 TABLET ORAL DAILY
Qty: 90 TABLET | Refills: 0 | Status: SHIPPED | OUTPATIENT
Start: 2024-04-12 | End: 2024-06-17

## 2024-04-12 NOTE — TELEPHONE ENCOUNTER
Any Mon DO Lidke, Jen M, RN  Caller: Unspecified (2 days ago,  4:23 PM)  Stop omeprazole    Spoke with patient who is in agreement to stop Omperazole.  He will reach out to PCP to discuss safer alternatives for his acid reflux.  Suri Quiñones, RN on 4/12/2024 at 2:04 PM

## 2024-04-15 RX ORDER — FAMOTIDINE 20 MG/1
20 TABLET, FILM COATED ORAL 2 TIMES DAILY
Qty: 180 TABLET | Refills: 3 | Status: SHIPPED | OUTPATIENT
Start: 2024-04-15 | End: 2024-05-15

## 2024-04-23 ENCOUNTER — OFFICE VISIT (OUTPATIENT)
Dept: CARDIOLOGY | Facility: CLINIC | Age: 54
End: 2024-04-23
Payer: COMMERCIAL

## 2024-04-23 VITALS
HEART RATE: 80 BPM | DIASTOLIC BLOOD PRESSURE: 87 MMHG | BODY MASS INDEX: 30.45 KG/M2 | SYSTOLIC BLOOD PRESSURE: 125 MMHG | WEIGHT: 205.6 LBS | HEIGHT: 69 IN

## 2024-04-23 DIAGNOSIS — E78.2 MIXED HYPERLIPIDEMIA: Primary | ICD-10-CM

## 2024-04-23 DIAGNOSIS — Z98.61 S/P PTCA (PERCUTANEOUS TRANSLUMINAL CORONARY ANGIOPLASTY): ICD-10-CM

## 2024-04-23 DIAGNOSIS — R93.1 ABNORMAL CARDIAC CT ANGIOGRAPHY: ICD-10-CM

## 2024-04-23 PROCEDURE — 99214 OFFICE O/P EST MOD 30 MIN: CPT | Performed by: INTERNAL MEDICINE

## 2024-04-23 RX ORDER — OMEPRAZOLE 40 MG/1
40 CAPSULE, DELAYED RELEASE ORAL DAILY
COMMUNITY

## 2024-04-23 NOTE — PROGRESS NOTES
HPI and Plan:   Tariq Mccain is a 53 year old male who presents with history of unstable angina, recent angiogram with severe stenosis of his ramus branch and PTCA with DAVIAN without complication.  He was noted to have moderate disease in the t first diagonal of the LAD and mild disease in the mid LAD.  He was started on Plavix and baby aspirin recommended to continue for 1 year.  I started him on 20 mg of atorvastatin with our initial visit in early April and he seems to be tolerating this medication as well.  He is making attempts to change his diet lose some weight and incorporate regular exercise into his daily routine now.  He has not experienced any recurrent symptoms of chest discomfort since his procedure.  There was some concern about him continuing with omeprazole while on Plavix, unfortunately this is the only medicine that really seems to work well for him.  We talked about the risks of suboptimal levels of Plavix postprocedure.    Summary    1.  Unstable angina with evidence of coronary artery disease and status post PCI to his ramus intermedius with resolution of his symptoms he will need to continue on dual antiplatelet therapy for a minimum of 1 year.  Recommend following up with cardiac rehab    2.  Hyperlipidemia-started on 20 mg of atorvastatin, would like to recheck his cholesterol numbers in July, we will likely need to titrate this medication.    Please feel free to contact me with any questions given regards to his care.       Today's clinic visit entailed:  Review of the result(s) of each unique test - angiogram, BMP  Ordering of each unique test    Provider  Link to MDM Help Grid     The level of medical decision making during this visit was of moderate complexity.    Orders Placed This Encounter   Procedures    Lipid Profile    ALT    Follow-Up with Cardiology AMOL     Orders Placed This Encounter   Medications    omeprazole (PRILOSEC) 40 MG DR capsule     Sig: Take 40 mg by mouth daily      There are no discontinued medications.      Encounter Diagnoses   Name Primary?    Mixed hyperlipidemia Yes    Abnormal cardiac CT angiography     S/P PTCA (percutaneous transluminal coronary angioplasty)        CURRENT MEDICATIONS:  Current Outpatient Medications   Medication Sig Dispense Refill    ALPRAZolam (XANAX) 0.5 MG tablet TAKE 1 TABLET(0.5 MG) BY MOUTH DAILY AS NEEDED FOR ANXIETY 15 tablet 0    atorvastatin (LIPITOR) 20 MG tablet Take 1 tablet (20 mg) by mouth daily 90 tablet 3    citalopram (CELEXA) 20 MG tablet TAKE 1 TABLET(20 MG) BY MOUTH DAILY 90 tablet 0    clopidogrel (PLAVIX) 75 MG tablet Take 1 tablet (75 mg) by mouth daily 90 tablet 3    levothyroxine (SYNTHROID/LEVOTHROID) 112 MCG tablet TAKE 1 TABLET(112 MCG) BY MOUTH DAILY 90 tablet 2    multivitamin, therapeutic (THERA-VIT) TABS tablet Take 1 tablet by mouth daily      nitroGLYcerin (NITROSTAT) 0.4 MG sublingual tablet For chest pain place 1 tablet under the tongue every 5 minutes for 3 doses. If symptoms persist 5 minutes after 1st dose call 911. 30 tablet 11    omeprazole (PRILOSEC) 40 MG DR capsule Take 40 mg by mouth daily      sildenafil (VIAGRA) 100 MG tablet Take 1 tablet (100 mg) by mouth daily as needed (erectile dysfunction) 30 tablet 1    aspirin (ASA) 81 MG chewable tablet Take 1 tablet (81 mg) by mouth daily Starting tomorrow. (Patient not taking: Reported on 4/23/2024)      famotidine (PEPCID) 20 MG tablet Take 1 tablet (20 mg) by mouth 2 times daily (Patient not taking: Reported on 4/23/2024) 180 tablet 3       ALLERGIES     Allergies   Allergen Reactions    Cefuroxime Rash    Sertraline Rash    Simvastatin Rash and Muscle Pain (Myalgia)       PAST MEDICAL HISTORY:  Past Medical History:   Diagnosis Date    Anxiety state, unspecified     Depressive disorder 2002    Status post coronary angiogram 4/9/2024       PAST SURGICAL HISTORY:  Past Surgical History:   Procedure Laterality Date    COLONOSCOPY  2021    CV CORONARY  ANGIOGRAM N/A 2024    Procedure: Coronary Angiogram;  Surgeon: Willa Medel MD;  Location:  [facility]    CV PCI N/A 2024    Procedure: Percutaneous Coronary Intervention;  Surgeon: Willa Medel MD;  Location:  [facility]    ENT SURGERY         FAMILY HISTORY:  Family History   Problem Relation Age of Onset    Depression Mother     Anxiety Disorder Mother     Substance Abuse Father     Cancer Maternal Grandfather         skin     Thyroid Disease Maternal Grandfather     Diabetes Maternal Grandmother         type 2    Eye Disorder Maternal Grandmother         cataract    Anxiety Disorder Paternal Half-Brother        SOCIAL HISTORY:  Social History     Socioeconomic History    Marital status:      Spouse name: Arlette    Number of children: 1    Years of education: 14    Highest education level: None   Occupational History    Occupation: Store Manger   Tobacco Use    Smoking status: Former     Current packs/day: 0.00     Types: Cigarettes, Other     Quit date: 2001     Years since quittin.3    Smokeless tobacco: Never    Tobacco comments:     Still using E-cig   Vaping Use    Vaping status: Some Days    Substances: Nicotine    Devices: Refillable tank   Substance and Sexual Activity    Alcohol use: Not Currently     Comment: Very Occasional    Drug use: Not Currently     Comment: Have not used drugs in many years, but did when I was young.    Sexual activity: Yes     Partners: Female     Birth control/protection: Male Surgical   Other Topics Concern    Parent/sibling w/ CABG, MI or angioplasty before 65F 55M? No     Social Determinants of Health     Financial Resource Strain: Low Risk  (10/24/2023)    Financial Resource Strain     Within the past 12 months, have you or your family members you live with been unable to get utilities (heat, electricity) when it was really needed?: No   Food Insecurity: Low Risk  (10/24/2023)    Food Insecurity     Within  "the past 12 months, did you worry that your food would run out before you got money to buy more?: No     Within the past 12 months, did the food you bought just not last and you didn t have money to get more?: No   Transportation Needs: Low Risk  (10/24/2023)    Transportation Needs     Within the past 12 months, has lack of transportation kept you from medical appointments, getting your medicines, non-medical meetings or appointments, work, or from getting things that you need?: No    [facility], [facility]    Social Connections   Interpersonal Safety: Low Risk  (10/25/2023)    Interpersonal Safety     Do you feel physically and emotionally safe where you currently live?: Yes     Within the past 12 months, have you been hit, slapped, kicked or otherwise physically hurt by someone?: No     Within the past 12 months, have you been humiliated or emotionally abused in other ways by your partner or ex-partner?: No   Housing Stability: Low Risk  (10/24/2023)    Housing Stability     Do you have housing? : Yes     Are you worried about losing your housing?: No       Review of Systems:  Skin:        Eyes:       ENT:       Respiratory:  Negative    Cardiovascular:  Negative;palpitations;chest pain;syncope or near-syncope;cyanosis;lightheadedness;dizziness;exercise intolerance;fatigue;edema    Gastroenterology:      Genitourinary:       Musculoskeletal:       Neurologic:       Psychiatric:       Heme/Lymph/Imm:       Endocrine:  Positive for thyroid disorder    Physical Exam:  Vitals: /87   Pulse 80   Ht 1.753 m (5' 9\")   Wt 93.3 kg (205 lb 9.6 oz)   BMI 30.36 kg/m      Constitutional:  cooperative;in no acute distress        Skin:  warm and dry to the touch          Head:  normocephalic        Eyes:  pupils equal and round        Lymph:      ENT:  no pallor or cyanosis        Neck:  no carotid bruit        Respiratory:  clear to " "auscultation;normal symmetry         Cardiac: regular rhythm;no murmurs, gallops or rubs detected                pulses full and equal                                        GI:  abdomen soft        Extremities and Muscular Skeletal:  no edema;no deformities, clubbing, cyanosis, erythema observed              Neurological:  no gross motor deficits;affect appropriate        Psych:  Alert and Oriented x 3        Recent Lab Results:  LIPID RESULTS:  Lab Results   Component Value Date    CHOL 240 (H) 04/09/2024    CHOL 247 (H) 07/08/2005    HDL 41 04/09/2024    HDL 44 07/08/2005     (H) 04/09/2024     (H) 07/08/2005    TRIG 42 04/09/2024    TRIG 79 07/08/2005    CHOLHDLRATIO 5.6 (H) 07/08/2005       LIVER ENZYME RESULTS:  Lab Results   Component Value Date    AST 27 10/25/2023    ALT 33 10/25/2023       CBC RESULTS:  Lab Results   Component Value Date    WBC 4.8 04/09/2024    RBC 4.97 04/09/2024    HGB 15.0 04/09/2024    HCT 43.5 04/09/2024    MCV 88 04/09/2024    MCH 30.2 04/09/2024    MCHC 34.5 04/09/2024    RDW 13.1 04/09/2024     04/09/2024       BMP RESULTS:  Lab Results   Component Value Date     04/09/2024    POTASSIUM 4.7 04/09/2024    CHLORIDE 103 04/09/2024    CO2 27 04/09/2024    ANIONGAP 10 04/09/2024     (H) 04/09/2024    GLC 88 07/08/2005    BUN 19.4 04/09/2024    CR 1.08 04/09/2024    GFRESTIMATED 81 04/09/2024    CHONG 9.3 04/09/2024        A1C RESULTS:  No results found for: \"A1C\"    INR RESULTS:  Lab Results   Component Value Date    INR 0.94 04/09/2024           CC  No referring provider defined for this encounter.                "

## 2024-04-23 NOTE — LETTER
4/23/2024    Ej De Leon MD  [address] [place] [address]    RE: Tariq Mccain       Dear Colleague,     I had the pleasure of seeing Tariq Mccain in the [facility].  HPI and Plan:   Tariq Mccain is a 53 year old male who presents with history of unstable angina, recent angiogram with severe stenosis of his ramus branch and PTCA with DAVIAN without complication.  He was noted to have moderate disease in the t first diagonal of the LAD and mild disease in the mid LAD.  He was started on Plavix and baby aspirin recommended to continue for 1 year.  I started him on 20 mg of atorvastatin with our initial visit in early April and he seems to be tolerating this medication as well.  He is making attempts to change his diet lose some weight and incorporate regular exercise into his daily routine now.  He has not experienced any recurrent symptoms of chest discomfort since his procedure.  There was some concern about him continuing with omeprazole while on Plavix, unfortunately this is the only medicine that really seems to work well for him.  We talked about the risks of suboptimal levels of Plavix postprocedure.    Summary    1.  Unstable angina with evidence of coronary artery disease and status post PCI to his ramus intermedius with resolution of his symptoms he will need to continue on dual antiplatelet therapy for a minimum of 1 year.  Recommend following up with cardiac rehab    2.  Hyperlipidemia-started on 20 mg of atorvastatin, would like to recheck his cholesterol numbers in July, we will likely need to titrate this medication.    Please feel free to contact me with any questions given regards to his care.       Today's clinic visit entailed:  Review of the result(s) of each unique test - angiogram, BMP  Ordering of each unique test    Provider  Link to [facility] Help Grid     The level of medical decision making during this visit was of moderate complexity.    Orders  Placed This Encounter   Procedures    Lipid Profile    ALT    Follow-Up with Cardiology AMOL     Orders Placed This Encounter   Medications    omeprazole (PRILOSEC) 40 MG DR capsule     Sig: Take 40 mg by mouth daily     There are no discontinued medications.      Encounter Diagnoses   Name Primary?    Mixed hyperlipidemia Yes    Abnormal cardiac CT angiography     S/P PTCA (percutaneous transluminal coronary angioplasty)        CURRENT MEDICATIONS:  Current Outpatient Medications   Medication Sig Dispense Refill    ALPRAZolam (XANAX) 0.5 MG tablet TAKE 1 TABLET(0.5 MG) BY MOUTH DAILY AS NEEDED FOR ANXIETY 15 tablet 0    atorvastatin (LIPITOR) 20 MG tablet Take 1 tablet (20 mg) by mouth daily 90 tablet 3    citalopram (CELEXA) 20 MG tablet TAKE 1 TABLET(20 MG) BY MOUTH DAILY 90 tablet 0    clopidogrel (PLAVIX) 75 MG tablet Take 1 tablet (75 mg) by mouth daily 90 tablet 3    levothyroxine (SYNTHROID/LEVOTHROID) 112 MCG tablet TAKE 1 TABLET(112 MCG) BY MOUTH DAILY 90 tablet 2    multivitamin, therapeutic (THERA-VIT) TABS tablet Take 1 tablet by mouth daily      nitroGLYcerin (NITROSTAT) 0.4 MG sublingual tablet For chest pain place 1 tablet under the tongue every 5 minutes for 3 doses. If symptoms persist 5 minutes after 1st dose call 911. 30 tablet 11    omeprazole (PRILOSEC) 40 MG DR capsule Take 40 mg by mouth daily      sildenafil (VIAGRA) 100 MG tablet Take 1 tablet (100 mg) by mouth daily as needed (erectile dysfunction) 30 tablet 1    aspirin (ASA) 81 MG chewable tablet Take 1 tablet (81 mg) by mouth daily Starting tomorrow. (Patient not taking: Reported on 4/23/2024)      famotidine (PEPCID) 20 MG tablet Take 1 tablet (20 mg) by mouth 2 times daily (Patient not taking: Reported on 4/23/2024) 180 tablet 3       ALLERGIES     Allergies   Allergen Reactions    Cefuroxime Rash    Sertraline Rash    Simvastatin Rash and Muscle Pain (Myalgia)       PAST MEDICAL HISTORY:  Past Medical History:   Diagnosis Date     Anxiety state, unspecified     Depressive disorder 2002    Status post coronary angiogram 2024       PAST SURGICAL HISTORY:  Past Surgical History:   Procedure Laterality Date    COLONOSCOPY      CV CORONARY ANGIOGRAM N/A 2024    Procedure: Coronary Angiogram;  Surgeon: Willa Medel MD;  Location:  [facility]    CV PCI N/A 2024    Procedure: Percutaneous Coronary Intervention;  Surgeon: Willa Medel MD;  Location:  [facility]    ENT SURGERY  1973       FAMILY HISTORY:  Family History   Problem Relation Age of Onset    Depression Mother     Anxiety Disorder Mother     Substance Abuse Father     Cancer Maternal Grandfather         skin     Thyroid Disease Maternal Grandfather     Diabetes Maternal Grandmother         type 2    Eye Disorder Maternal Grandmother         cataract    Anxiety Disorder Paternal Half-Brother        SOCIAL HISTORY:  Social History     Socioeconomic History    Marital status:      Spouse name: Arlette    Number of children: 1    Years of education: 14    Highest education level: None   Occupational History    Occupation: Store Manger   Tobacco Use    Smoking status: Former     Current packs/day: 0.00     Types: Cigarettes, Other     Quit date: 2001     Years since quittin.3    Smokeless tobacco: Never    Tobacco comments:     Still using E-cig   Vaping Use    Vaping status: Some Days    Substances: Nicotine    Devices: Refillable tank   Substance and Sexual Activity    Alcohol use: Not Currently     Comment: Very Occasional    Drug use: Not Currently     Comment: Have not used drugs in many years, but did when I was young.    Sexual activity: Yes     Partners: Female     Birth control/protection: Male Surgical   Other Topics Concern    Parent/sibling w/ CABG, MI or angioplasty before 65F 55M? No     Social Determinants of Health     Financial Resource Strain: Low Risk  (10/24/2023)    Financial Resource Strain     Within the  "past 12 months, have you or your family members you live with been unable to get utilities (heat, electricity) when it was really needed?: No   Food Insecurity: Low Risk  (10/24/2023)    Food Insecurity     Within the past 12 months, did you worry that your food would run out before you got money to buy more?: No     Within the past 12 months, did the food you bought just not last and you didn t have money to get more?: No   Transportation Needs: Low Risk  (10/24/2023)    Transportation Needs     Within the past 12 months, has lack of transportation kept you from medical appointments, getting your medicines, non-medical meetings or appointments, work, or from getting things that you need?: No    [facility] [employer] [facility]    Social Connections   Interpersonal Safety: Low Risk  (10/25/2023)    Interpersonal Safety     Do you feel physically and emotionally safe where you currently live?: Yes     Within the past 12 months, have you been hit, slapped, kicked or otherwise physically hurt by someone?: No     Within the past 12 months, have you been humiliated or emotionally abused in other ways by your partner or ex-partner?: No   Housing Stability: Low Risk  (10/24/2023)    Housing Stability     Do you have housing? : Yes     Are you worried about losing your housing?: No       Review of Systems:  Skin:        Eyes:       ENT:       Respiratory:  Negative    Cardiovascular:  Negative;palpitations;chest pain;syncope or near-syncope;cyanosis;lightheadedness;dizziness;exercise intolerance;fatigue;edema    Gastroenterology:      Genitourinary:       Musculoskeletal:       Neurologic:       Psychiatric:       Heme/Lymph/Imm:       Endocrine:  Positive for thyroid disorder    Physical Exam:  Vitals: /87   Pulse 80   Ht 1.753 m (5' 9\")   Wt 93.3 kg (205 lb 9.6 oz)   BMI 30.36 kg/m      Constitutional:  cooperative;in no acute distress    " "    Skin:  warm and dry to the touch          Head:  normocephalic        Eyes:  pupils equal and round        Lymph:      ENT:  no pallor or cyanosis        Neck:  no carotid bruit        Respiratory:  clear to auscultation;normal symmetry         Cardiac: regular rhythm;no murmurs, gallops or rubs detected                pulses full and equal                                        GI:  abdomen soft        Extremities and Muscular Skeletal:  no edema;no deformities, clubbing, cyanosis, erythema observed              Neurological:  no gross motor deficits;affect appropriate        Psych:  Alert and Oriented x 3        Recent Lab Results:  LIPID RESULTS:  Lab Results   Component Value Date    CHOL 240 (H) 04/09/2024    CHOL 247 (H) 07/08/2005    HDL 41 04/09/2024    HDL 44 07/08/2005     (H) 04/09/2024     (H) 07/08/2005    TRIG 42 04/09/2024    TRIG 79 07/08/2005    CHOLHDLRATIO 5.6 (H) 07/08/2005       LIVER ENZYME RESULTS:  Lab Results   Component Value Date    AST 27 10/25/2023    ALT 33 10/25/2023       CBC RESULTS:  Lab Results   Component Value Date    WBC 4.8 04/09/2024    RBC 4.97 04/09/2024    HGB 15.0 04/09/2024    HCT 43.5 04/09/2024    MCV 88 04/09/2024    MCH 30.2 04/09/2024    MCHC 34.5 04/09/2024    RDW 13.1 04/09/2024     04/09/2024       BMP RESULTS:  Lab Results   Component Value Date     04/09/2024    POTASSIUM 4.7 04/09/2024    CHLORIDE 103 04/09/2024    CO2 27 04/09/2024    ANIONGAP 10 04/09/2024     (H) 04/09/2024    GLC 88 07/08/2005    BUN 19.4 04/09/2024    CR 1.08 04/09/2024    GFRESTIMATED 81 04/09/2024    CHONG 9.3 04/09/2024        A1C RESULTS:  No results found for: \"A1C\"    INR RESULTS:  Lab Results   Component Value Date    INR 0.94 04/09/2024           CC  No referring provider defined for this encounter.      Thank you for allowing me to participate in the care of your patient.      Sincerely,     [facility]" [facility]

## 2024-05-08 DIAGNOSIS — F41.1 GENERALIZED ANXIETY DISORDER: ICD-10-CM

## 2024-05-08 DIAGNOSIS — F33.42 MAJOR DEPRESSIVE DISORDER, RECURRENT EPISODE, IN FULL REMISSION (H): ICD-10-CM

## 2024-05-08 RX ORDER — ALPRAZOLAM 0.5 MG
TABLET ORAL
Qty: 15 TABLET | Refills: 0 | Status: SHIPPED | OUTPATIENT
Start: 2024-05-08 | End: 2024-08-19

## 2024-05-15 ENCOUNTER — TELEPHONE (OUTPATIENT)
Dept: NURSING | Facility: CLINIC | Age: 54
End: 2024-05-15

## 2024-05-15 ENCOUNTER — VIRTUAL VISIT (OUTPATIENT)
Dept: INTERNAL MEDICINE | Facility: CLINIC | Age: 54
End: 2024-05-15
Payer: COMMERCIAL

## 2024-05-15 DIAGNOSIS — E78.2 MIXED HYPERLIPIDEMIA: ICD-10-CM

## 2024-05-15 DIAGNOSIS — I25.10 CORONARY ARTERY DISEASE INVOLVING NATIVE CORONARY ARTERY OF NATIVE HEART WITHOUT ANGINA PECTORIS: Primary | ICD-10-CM

## 2024-05-15 DIAGNOSIS — Z95.5 S/P DRUG ELUTING CORONARY STENT PLACEMENT: ICD-10-CM

## 2024-05-15 DIAGNOSIS — F10.21 ALCOHOL USE DISORDER, MODERATE, IN EARLY REMISSION (H): ICD-10-CM

## 2024-05-15 DIAGNOSIS — I25.118 CORONARY ARTERY DISEASE OF NATIVE ARTERY OF NATIVE HEART WITH STABLE ANGINA PECTORIS (H): ICD-10-CM

## 2024-05-15 PROBLEM — R93.1 ABNORMAL CARDIAC CT ANGIOGRAPHY: Status: RESOLVED | Noted: 2024-04-09 | Resolved: 2024-05-15

## 2024-05-15 PROBLEM — Z98.890 STATUS POST CORONARY ANGIOGRAM: Status: RESOLVED | Noted: 2024-04-09 | Resolved: 2024-05-15

## 2024-05-15 PROBLEM — R07.89 OTHER CHEST PAIN: Status: RESOLVED | Noted: 2024-04-09 | Resolved: 2024-05-15

## 2024-05-15 PROBLEM — R07.9 INTERMITTENT CHEST PAIN: Status: RESOLVED | Noted: 2024-04-09 | Resolved: 2024-05-15

## 2024-05-15 PROCEDURE — G2211 COMPLEX E/M VISIT ADD ON: HCPCS | Mod: 95 | Performed by: INTERNAL MEDICINE

## 2024-05-15 PROCEDURE — 99214 OFFICE O/P EST MOD 30 MIN: CPT | Mod: 95 | Performed by: INTERNAL MEDICINE

## 2024-05-15 RX ORDER — NALTREXONE HYDROCHLORIDE 50 MG/1
50 TABLET, FILM COATED ORAL DAILY
Qty: 90 TABLET | Refills: 1 | Status: SHIPPED | OUTPATIENT
Start: 2024-05-15 | End: 2024-05-20

## 2024-05-15 RX ORDER — ASPIRIN 81 MG/1
81 TABLET, CHEWABLE ORAL DAILY
COMMUNITY
Start: 2024-05-15

## 2024-05-15 NOTE — TELEPHONE ENCOUNTER
Patient returning missed call regarding getting registered for his 5:30 pm video visit. Patient has already answered pre visit questionnaires and had done video visits before.  Patient will be logged on and ready for appointment 10 minutes early.   No messages in chart to relay to patient.   Brooke Langley RN   05/15/24 3:35 PM  [facility] Nurse Advisor

## 2024-05-15 NOTE — PROGRESS NOTES
"Tariq is a 53 year old who is being evaluated via a billable video visit.    How would you like to obtain your AVS? MyChart  If the video visit is dropped, the invitation should be resent by: Send to e-mail at: [email or web address]  Will anyone else be joining your video visit? No      Assessment & Plan     (I25.10) Coronary artery disease involving native coronary artery of native heart without angina pectoris  (primary encounter diagnosis)  Comment: unstable angina earlier this year, now s/p DAVIAN placement ramus intermedius. Symptoms have improved. On asa, plavix, statin.  Plan: follow up lipids this summer.    (E78.2) Mixed hyperlipidemia  Comment: on statin now  Plan: repeat labs this summer, tolerating well.    (F10.21) Alcohol use disorder, moderate, in early remission (H)  Comment: discussed that recent events, stress, anxiety has caused a bump up in his alcohol intake. Would like to try naltrexone  Plan: naltrexone (DEPADE/REVIA) 50 MG tablet        I think this is reasonable. Confirmed that he is also seeing a counselor for this. Early follow up if this strategy is not successful, discussed in detail.    Regardless, follow up with me in 6 months.    The longitudinal plan of care for the diagnosis(es)/condition(s) as documented were addressed during this visit. Due to the added complexity in care, I will continue to support Tariq in the subsequent management and with ongoing continuity of care.           BMI  Estimated body mass index is 30.36 kg/m  as calculated from the following:    Height as of 4/23/24: 1.753 m (5' 9\").    Weight as of 4/23/24: 93.3 kg (205 lb 9.6 oz).             Subjective   Tariq is a 53 year old, presenting for the following health issues:  Follow Up (Discuss medication (Plavix) and post stent placement )      5/15/2024     4:03 PM   Additional Questions   Roomed by JUANJO Haywood   Accompanied by alone         5/15/2024     4:03 PM   Patient Reported Additional Medications "   Patient reports taking the following new medications none     History of Present Illness       Reason for visit:  Medication check and follow up questions    He eats 2-3 servings of fruits and vegetables daily.He consumes 0 sweetened beverage(s) daily.He exercises with enough effort to increase his heart rate 20 to 29 minutes per day.  He exercises with enough effort to increase his heart rate 4 days per week.   He is taking medications regularly.                   Objective           Vitals:  No vitals were obtained today due to virtual visit.    Physical Exam   GENERAL: alert and no distress  EYES: Eyes grossly normal to inspection.  No discharge or erythema, or obvious scleral/conjunctival abnormalities.  RESP: No audible wheeze, cough, or visible cyanosis.    SKIN: Visible skin clear. No significant rash, abnormal pigmentation or lesions.  NEURO: Cranial nerves grossly intact.  Mentation and speech appropriate for age.  PSYCH: Appropriate affect, tone, and pace of words          Video-Visit Details    Type of service:  Video Visit   Originating Location (pt. Location): Home    Distant Location (provider location):  On-site  Platform used for Video Visit: Kelvin  Signed Electronically by: Ej De Leon MD

## 2024-05-18 ENCOUNTER — MYC MEDICAL ADVICE (OUTPATIENT)
Dept: INTERNAL MEDICINE | Facility: CLINIC | Age: 54
End: 2024-05-18
Payer: COMMERCIAL

## 2024-05-18 DIAGNOSIS — F10.21 ALCOHOL USE DISORDER, MODERATE, IN EARLY REMISSION (H): ICD-10-CM

## 2024-05-21 RX ORDER — NALTREXONE HYDROCHLORIDE 50 MG/1
50 TABLET, FILM COATED ORAL DAILY
Qty: 90 TABLET | Refills: 1 | Status: SHIPPED | OUTPATIENT
Start: 2024-05-21 | End: 2024-08-30 | Stop reason: ALTCHOICE

## 2024-05-26 ENCOUNTER — HEALTH MAINTENANCE LETTER (OUTPATIENT)
Age: 54
End: 2024-05-26

## 2024-06-16 DIAGNOSIS — F33.42 MAJOR DEPRESSIVE DISORDER, RECURRENT EPISODE, IN FULL REMISSION (H): ICD-10-CM

## 2024-06-17 RX ORDER — CITALOPRAM HYDROBROMIDE 20 MG/1
20 TABLET ORAL DAILY
Qty: 90 TABLET | Refills: 0 | Status: SHIPPED | OUTPATIENT
Start: 2024-06-17 | End: 2024-08-30

## 2024-07-02 ENCOUNTER — MYC MEDICAL ADVICE (OUTPATIENT)
Dept: CARDIOLOGY | Facility: CLINIC | Age: 54
End: 2024-07-02
Payer: COMMERCIAL

## 2024-07-02 DIAGNOSIS — I25.118 CORONARY ARTERY DISEASE OF NATIVE ARTERY OF NATIVE HEART WITH STABLE ANGINA PECTORIS (H): ICD-10-CM

## 2024-07-02 DIAGNOSIS — Z95.5 S/P DRUG ELUTING CORONARY STENT PLACEMENT: ICD-10-CM

## 2024-07-02 RX ORDER — CLOPIDOGREL BISULFATE 75 MG/1
75 TABLET ORAL DAILY
Qty: 90 TABLET | Refills: 3 | Status: SHIPPED | OUTPATIENT
Start: 2024-07-02

## 2024-08-05 ENCOUNTER — MYC MEDICAL ADVICE (OUTPATIENT)
Dept: INTERNAL MEDICINE | Facility: CLINIC | Age: 54
End: 2024-08-05
Payer: COMMERCIAL

## 2024-08-05 DIAGNOSIS — F17.290 OTHER TOBACCO PRODUCT NICOTINE DEPENDENCE, UNCOMPLICATED: Primary | ICD-10-CM

## 2024-08-05 RX ORDER — VARENICLINE TARTRATE 0.5 (11)-1
KIT ORAL
Qty: 53 TABLET | Refills: 0 | Status: SHIPPED | OUTPATIENT
Start: 2024-08-05 | End: 2024-08-30 | Stop reason: ALTCHOICE

## 2024-08-19 DIAGNOSIS — F33.42 MAJOR DEPRESSIVE DISORDER, RECURRENT EPISODE, IN FULL REMISSION (H): ICD-10-CM

## 2024-08-19 DIAGNOSIS — F41.1 GENERALIZED ANXIETY DISORDER: ICD-10-CM

## 2024-08-19 RX ORDER — ALPRAZOLAM 0.5 MG
TABLET ORAL
Qty: 15 TABLET | Refills: 0 | Status: SHIPPED | OUTPATIENT
Start: 2024-08-19

## 2024-08-26 ENCOUNTER — MYC MEDICAL ADVICE (OUTPATIENT)
Dept: INTERNAL MEDICINE | Facility: CLINIC | Age: 54
End: 2024-08-26
Payer: COMMERCIAL

## 2024-08-26 DIAGNOSIS — F33.42 MAJOR DEPRESSIVE DISORDER, RECURRENT EPISODE, IN FULL REMISSION (H): Primary | ICD-10-CM

## 2024-08-29 ASSESSMENT — ANXIETY QUESTIONNAIRES
6. BECOMING EASILY ANNOYED OR IRRITABLE: NEARLY EVERY DAY
7. FEELING AFRAID AS IF SOMETHING AWFUL MIGHT HAPPEN: SEVERAL DAYS
GAD7 TOTAL SCORE: 15
1. FEELING NERVOUS, ANXIOUS, OR ON EDGE: NEARLY EVERY DAY
3. WORRYING TOO MUCH ABOUT DIFFERENT THINGS: MORE THAN HALF THE DAYS
7. FEELING AFRAID AS IF SOMETHING AWFUL MIGHT HAPPEN: SEVERAL DAYS
IF YOU CHECKED OFF ANY PROBLEMS ON THIS QUESTIONNAIRE, HOW DIFFICULT HAVE THESE PROBLEMS MADE IT FOR YOU TO DO YOUR WORK, TAKE CARE OF THINGS AT HOME, OR GET ALONG WITH OTHER PEOPLE: VERY DIFFICULT
GAD7 TOTAL SCORE: 15
8. IF YOU CHECKED OFF ANY PROBLEMS, HOW DIFFICULT HAVE THESE MADE IT FOR YOU TO DO YOUR WORK, TAKE CARE OF THINGS AT HOME, OR GET ALONG WITH OTHER PEOPLE?: VERY DIFFICULT
4. TROUBLE RELAXING: MORE THAN HALF THE DAYS
7. FEELING AFRAID AS IF SOMETHING AWFUL MIGHT HAPPEN: SEVERAL DAYS
2. NOT BEING ABLE TO STOP OR CONTROL WORRYING: MORE THAN HALF THE DAYS
2. NOT BEING ABLE TO STOP OR CONTROL WORRYING: MORE THAN HALF THE DAYS
5. BEING SO RESTLESS THAT IT IS HARD TO SIT STILL: MORE THAN HALF THE DAYS
8. IF YOU CHECKED OFF ANY PROBLEMS, HOW DIFFICULT HAVE THESE MADE IT FOR YOU TO DO YOUR WORK, TAKE CARE OF THINGS AT HOME, OR GET ALONG WITH OTHER PEOPLE?: VERY DIFFICULT
3. WORRYING TOO MUCH ABOUT DIFFERENT THINGS: MORE THAN HALF THE DAYS
6. BECOMING EASILY ANNOYED OR IRRITABLE: NEARLY EVERY DAY
1. FEELING NERVOUS, ANXIOUS, OR ON EDGE: NEARLY EVERY DAY
GAD7 TOTAL SCORE: 15
5. BEING SO RESTLESS THAT IT IS HARD TO SIT STILL: MORE THAN HALF THE DAYS
GAD7 TOTAL SCORE: 15
IF YOU CHECKED OFF ANY PROBLEMS ON THIS QUESTIONNAIRE, HOW DIFFICULT HAVE THESE PROBLEMS MADE IT FOR YOU TO DO YOUR WORK, TAKE CARE OF THINGS AT HOME, OR GET ALONG WITH OTHER PEOPLE: VERY DIFFICULT
7. FEELING AFRAID AS IF SOMETHING AWFUL MIGHT HAPPEN: SEVERAL DAYS
4. TROUBLE RELAXING: MORE THAN HALF THE DAYS

## 2024-08-29 ASSESSMENT — PATIENT HEALTH QUESTIONNAIRE - PHQ9
SUM OF ALL RESPONSES TO PHQ QUESTIONS 1-9: 10
10. IF YOU CHECKED OFF ANY PROBLEMS, HOW DIFFICULT HAVE THESE PROBLEMS MADE IT FOR YOU TO DO YOUR WORK, TAKE CARE OF THINGS AT HOME, OR GET ALONG WITH OTHER PEOPLE: SOMEWHAT DIFFICULT
SUM OF ALL RESPONSES TO PHQ QUESTIONS 1-9: 10
10. IF YOU CHECKED OFF ANY PROBLEMS, HOW DIFFICULT HAVE THESE PROBLEMS MADE IT FOR YOU TO DO YOUR WORK, TAKE CARE OF THINGS AT HOME, OR GET ALONG WITH OTHER PEOPLE: SOMEWHAT DIFFICULT

## 2024-08-30 ENCOUNTER — VIRTUAL VISIT (OUTPATIENT)
Dept: BEHAVIORAL HEALTH | Facility: CLINIC | Age: 54
End: 2024-08-30
Payer: COMMERCIAL

## 2024-08-30 ENCOUNTER — VIRTUAL VISIT (OUTPATIENT)
Dept: PSYCHIATRY | Facility: CLINIC | Age: 54
End: 2024-08-30
Attending: INTERNAL MEDICINE
Payer: COMMERCIAL

## 2024-08-30 ENCOUNTER — TELEPHONE (OUTPATIENT)
Dept: CARDIOLOGY | Facility: CLINIC | Age: 54
End: 2024-08-30
Payer: COMMERCIAL

## 2024-08-30 DIAGNOSIS — F43.10 PTSD (POST-TRAUMATIC STRESS DISORDER): ICD-10-CM

## 2024-08-30 DIAGNOSIS — F33.1 MODERATE EPISODE OF RECURRENT MAJOR DEPRESSIVE DISORDER (H): ICD-10-CM

## 2024-08-30 DIAGNOSIS — F41.1 GAD (GENERALIZED ANXIETY DISORDER): ICD-10-CM

## 2024-08-30 DIAGNOSIS — F33.1 MODERATE EPISODE OF RECURRENT MAJOR DEPRESSIVE DISORDER (H): Primary | ICD-10-CM

## 2024-08-30 DIAGNOSIS — F43.9 TRAUMA AND STRESSOR-RELATED DISORDER: ICD-10-CM

## 2024-08-30 DIAGNOSIS — F90.9 ATTENTION DEFICIT HYPERACTIVITY DISORDER (ADHD), UNSPECIFIED ADHD TYPE: ICD-10-CM

## 2024-08-30 DIAGNOSIS — F90.9 ATTENTION DEFICIT HYPERACTIVITY DISORDER (ADHD), UNSPECIFIED ADHD TYPE: Primary | ICD-10-CM

## 2024-08-30 PROCEDURE — G2211 COMPLEX E/M VISIT ADD ON: HCPCS | Mod: 95 | Performed by: PSYCHIATRY & NEUROLOGY

## 2024-08-30 PROCEDURE — 90791 PSYCH DIAGNOSTIC EVALUATION: CPT | Mod: 95 | Performed by: COUNSELOR

## 2024-08-30 PROCEDURE — 99204 OFFICE O/P NEW MOD 45 MIN: CPT | Mod: 95 | Performed by: PSYCHIATRY & NEUROLOGY

## 2024-08-30 RX ORDER — CITALOPRAM HYDROBROMIDE 20 MG/1
20 TABLET ORAL DAILY
Qty: 90 TABLET | Refills: 0 | Status: SHIPPED | OUTPATIENT
Start: 2024-08-30

## 2024-08-30 ASSESSMENT — COLUMBIA-SUICIDE SEVERITY RATING SCALE - C-SSRS
1. IN THE PAST MONTH, HAVE YOU WISHED YOU WERE DEAD OR WISHED YOU COULD GO TO SLEEP AND NOT WAKE UP?: YES
2. HAVE YOU ACTUALLY HAD ANY THOUGHTS OF KILLING YOURSELF?: NO
6. HAVE YOU EVER DONE ANYTHING, STARTED TO DO ANYTHING, OR PREPARED TO DO ANYTHING TO END YOUR LIFE?: NO
1. HAVE YOU WISHED YOU WERE DEAD OR WISHED YOU COULD GO TO SLEEP AND NOT WAKE UP?: YES
TOTAL  NUMBER OF ABORTED OR SELF INTERRUPTED ATTEMPTS LIFETIME: NO
ATTEMPT LIFETIME: NO
REASONS FOR IDEATION PAST MONTH: COMPLETELY TO END OR STOP THE PAIN (YOU COULDN'T GO ON LIVING WITH THE PAIN OR HOW YOU WERE FEELING)
REASONS FOR IDEATION LIFETIME: COMPLETELY TO END OR STOP THE PAIN (YOU COULDN'T GO ON LIVING WITH THE PAIN OR HOW YOU WERE FEELING)
TOTAL  NUMBER OF INTERRUPTED ATTEMPTS LIFETIME: NO

## 2024-08-30 ASSESSMENT — PAIN SCALES - GENERAL: PAINLEVEL: NO PAIN (0)

## 2024-08-30 NOTE — PATIENT INSTRUCTIONS
Treatment Plan:  Continue Celexa/citalopram 20 mg daiy for anxiety, depression, and history of trauma.    Continue alprazolam/Xanax 0.5 mg daily as needed for severe anxiety/panic only. Do not use daily.   Reach out to your cardiologist to discuss possible use of stimulants for ADHD in your case. Let me know if you hear back from you cardiologist. If ok, will start Vyvanse 20 mg daily.   Consider psychotherapy.  Continue all other cares per primary care provider.   Continue all other medications as reviewed per electronic medical record today.   Safety plan reviewed. To the Emergency Department as needed or call after hours crisis line at [phone] or [phone]. [place] Crisis Text Line: Text [place] to 897060  or  Suicide LifeLine Chat: suicidepreventionlifeline.org/chat  Schedule an appointment with me in 6 weeks or sooner as needed.  Call [facility] at [phone] to schedule.  Follow up with primary care provider as planned or sooner if needed for acute medical concerns.  Call the psychiatric nurse line with medication questions or concerns at [phone].  [employer] may be used to communicate with your provider, but this is not intended to be used for emergencies.    Patient Education:  Discussed risks of stimulant medication including, but not limited to, decreased appetite, risk of tics (and that they may be lasting), trouble sleeping, cardiac risks such as increased heart rate and blood pressure, and rare risk of sudden cardiac death.  Also risk of addiction/tolerance/dependence.    Risks of benzodiazepine (Ativan, Xanax, Klonopin, Valium, etc) use including, but not limited to, sedation, tolerance, risk for addiction/dependence. Do not drink alcohol while taking benzodiazepines due to risk of trouble breathing and potential death. Do not drive or operate heavy machinery until it is known how the drug affects you. Discuss with physician or pharmacist before ever taking a benzodiazepine  with a narcotic/opioid pain medication.     Good ADHD resources:  Books-Mastering Adult ADHD, Driven to Distraction, Take Charge of Adult ADHD  Website-www.[employer]    ADHD medication expectations:   https://www.[employer]/slideshows/qnw-nvgt-vltj-medication-work/    What to Expect and What NOT to Expect from Stimulant Medication  by Ml Snyder, PhD    Many clients taking stimulant medication aren t sure what to expect from it. They may have unrealistic expectations of their medication and decide it s not working. Or they may have become used to the benefits of stimulant medication and think it s no longer working.    Here s a list of things to expect when taking stimulant medications. Of course, everyone has a different reaction and a different level of sensitivity to medication, so some seem to benefit much more clearly than others.    A good response to stimulant medication typically results in:    - Improved attention span - being able to read longer while staying focused; being able to listen longer while staying focused.    - Reduced distractability - being able to remain focused when some distractions occur around you.    - Greater ease in getting back on task after an interruption.    - Better working memory - i.e., being able to remember the three things you went downstairs to get.    - Easier to start tasks and complete them.    - Reduced feeling of stress and overwhelm.    - Decreased irritability and over-reactivity    - Reduced feelings of restlessness or hyperactivity    - Reduced impulsiveness - less likely to interrupt, to make decisions with little consideration for costs or consequences    However, as Corbin Bae MD, said so memorably,  Pills don t build skills.  You shouldn t expect stimulant medication to help you organize your files, improve your study skills, write your paper, prioritize your tasks, reduce your clutter, or problem-solve better. But it will put your brain in  "a state where these skills can be more easily learned.    Often, the best pairing is stimulant medication in combination with a therapist, organizer or  that is helping you build the skills you need to succeed now that you re able to focus and concentrate.    Get Out of Your Mind & Into Your Life   By Geremias Singh    The Happiness Trap: How to Stop Struggling and Start Living  By Tyrell Jack    The Reality Slap: Finding Peace & Fulfillment When Life Hurts  By Tyrell Jack    Things Might Go Terribly, Horribly Wrong: A Guide to Life Liberated from Anxiety  By Juana Garza, PhD    Stress Less, Live More: How Acceptance and Commitment Therapy Can Help You Live a Busy Yet Balanced Life  By Sukumar Cole    Finding Life Beyond Trauma: Using Acceptance and Commitment Therapy to Heal From Post-Traumatic Stress and Trauma-Related Problems  By Vanessa Gallardo and Jessica Stroud    Other ACT Skills References     Tyrell Jack on YouTube - Demonstrates several different skills to deal with difficult thoughts and feelings     Book:  \"A Liberated Mind: How to Pivot Toward What Matters\" by Geremias Singh     Psychological flexibility: How love turns pain into purpose  Tedx Talk by Dr. Singh discussing his struggle with anxiety and panic disorder which motivated him to develop ACT therapy (Acceptance and Commitment Therapy)     You Are Not Your Thoughts    Care team has reviewed attendance agreement with patient. Patient advised that two failed appointments within 6 months may lead to termination of current episode of care.     Community Resources:    National Suicide Prevention Lifeline: 343.229.4744 (TTY: 729.850.5732). Call anytime for help.  (www.suicidepreventionlifeline.org)  National [employer] [employer] Mental Illness (www.geo.org): 166.598.5063 or 298-355-2867.   Mental Health Association (www.mentalhealth.org): 385.410.3748 or 087-722-9981.  [place] Crisis Text Line: Text [place] to 431620  Suicide LifeLine Chat: " "suicidepreventionlifeline.org/chat    Patient Education   Collaborative Care Psychiatry Service  What to Expect  Here's what to expect from your Collaborative Care Psychiatry Service (CCPS).   About CCPS  CCPS means 2 people work together to help you get better. You'll meet with a behavioral health clinician and a psychiatric doctor. A behavioral health clinician helps people with mental health problems by talking with them. A psychiatric doctor helps people by giving them medicine.  How it works  At every visit, you'll see the behavioral health clinician (BHC) first. They'll talk with you about how you're doing and teach you how to feel better.   Then you'll see the psychiatric doctor. This doctor can help you deal with troubling thoughts and feelings by giving you medicine. They'll make sure you know the plan for your care.   CCPS usually takes 3 to 6 visits. If you need more visits, we may have you start seeing a different psychiatric doctor for ongoing care.  If you have any questions or concerns, we'll be glad to talk with you.  About visits  Be open  At your visits, please talk openly about your problems. It may feel hard, but it's the best way for us to help you.  Cancelling visits  If you can't come to your visit, please call us right away at [phone]. If you don't cancel at least 24 hours (1 full day) before your visit, that's \"late cancellation.\"  Being late to visits  Being very late is the same as not showing up. You will be a \"no show\" if:  Your appointment starts with a BHC, and you're more than 15 minutes late for a 30-minute (half hour) visit. This will also cancel your appointment with the psychiatric doctor.  Your appointment is with a psychiatric doctor only, and you're more than 15 minutes late for a 30-minute (half hour) visit.  Your appointment is with a psychiatric doctor only, and you're more than 30 minutes late for a 60-minute (full hour) visit.  If you cancel late or don't show up 2 " times within 6 months, we may end your care.   Getting help between visits  If you need help between visits, you can call us Monday to Friday from 8 a.m. to 4:30 p.m. at [phone].  Emergency care  Call 911 or go to the nearest emergency department if your life or someone else's life is in danger.  Call 988 anytime to reach the national Suicide and Crisis hotline.  Medicine refills  To refill your medicine, call your pharmacy. You can also call [facility]'s [facility] at [phone], Monday to Friday, 8 a.m. to 4:30 p.m. It can take 1 to 3 business days to get a refill.   Forms, letters, and tests  You may have papers to fill out, like FMLA, short-term disability, and workability. We can help you with these forms at your visits, but you must have an appointment. You may need more than 1 visit for this, to be in an intensive therapy program, or both.  Before we can give you medicine for ADHD, we may refer you to get tested for it or confirm it another way.  We may not be able to give you an emotional support animal letter.  We don't do mental health checks ordered by the court.   We don't do mental health testing, but we can refer you to get tested.   Thank you for choosing us for your care.  For informational purposes only. Not to replace the advice of your health care provider. Copyright   2022 [facility]. All rights reserved. [employer] [id number] - 12/22.

## 2024-08-30 NOTE — PROGRESS NOTES
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   Patient Active Problem List    Diagnosis Date Noted    Coronary artery disease involving native coronary artery of native heart without angina pectoris 05/15/2024     Priority: Medium    Chronic ulcerative pancolitis with complication (H) 09/15/2022     Priority: Medium    Pastor's esophagus with low grade dysplasia 06/28/2016     Priority: Medium     Formatting of this note might be different from the original.  2016-EGD, low grade dysplasia  S/p EGD with RFA 12/13/16      Alcohol use disorder, moderate, in early remission (H) 07/10/2014     Priority: Medium    Major depressive disorder, recurrent episode, in full remission (H24) 06/12/2014     Priority: Medium     Formatting of this note is different from the original.  Psychiatry to Primary Care Hand-over  Psychiatrist: Monica Rodriguez MD   PCP: Ej De Leon MD  Last Visit with Psychiatry:  3/8/2017  Current Medication/s and dose/s: Celexa 20mg  Medications tried and failed/Reason(s) for discontinuation:  Wellbutrin (head felt weird), maybe Prozac (? Didn't like it), Celexa (6 years - no side effects, probably helps).  Sertraline listed as an Allergy.  Recommendations/Plan of care:   Continue Celexa long term.  Recommended Labs Monitoring and Frequency:  n/a  Last PHQ-9:   PHQ-9 12/22/2015 5/12/2015 11/29/2014 11/3/2014 8/21/2014 7/10/2014 6/10/2014   PHQ9 Score - Smartform 3 1 - - 2 8 3   PHQ9 Score - Online Questions - - 1 0 - - -      Kidney stone 02/01/2012     Priority: Medium    Mixed hyperlipidemia 10/20/2009     Priority: Medium    Acquired hypothyroidism 10/20/2009     Priority: Medium    Anxiety disorder 10/20/2009     Priority: Medium       Impression:  Tariq Mccain is a 53-year-old with past psychiatric history including depression, anxiety, trauma, substance abuse in remission who presents today for psychiatric evaluation.  Patient with long history of mental health symptoms dating back to childhood.  Significant history of trauma.  Reports being diagnosed with oppositional defiant disorder as a child.  Struggled significantly in school and started struggling with substance abuse at a young age.  Has not had any substance use struggles for quite some time.  Was last in chemical dependency treatment about 12 years ago for alcohol use.  Uses alcohol currently but denies any problematic use and only uses a couple times a week.  Patient with several features of ADHD as a child and as an adult that are currently interfering with functioning in at least 2 domains.  Patient's son also diagnosed with ADHD and treated with stimulant medication.  Some chronic low grade depression and anxiety symptoms could be related to undertreated/untreated ADHD.  Discussed changing Celexa to a different antidepressant versus trial with ADHD medication.  Patient did not tolerate Wellbutrin XL in the past and so would consider stimulant medication.  Did have coronary angiogram with stent placement in April of this year.  Patient denies any other diagnoses such as arrhythmias, high blood pressure, cardiac hypertrophy, etc.  Discussed I would want patient to discuss possible use of stimulants with  cardiology provider prior to prescribing. If cardiology okay with stimulant use in this patient's case, I would send prescription for Vyvanse 20 mg vs Adderall xr 10 mg to  [facility] at [address] [place] [address] [address] [facility].  Could consider clonidine or guanfacine as ADHD medication if stimulants not recommended.  Could also consider changing Celexa to Effexor-XR/venlafaxine ER.  No acute safety concerns.  No SI.  No problematic drug or alcohol use.  Patient encouraged to consider individual psychotherapy for additional support and ongoing development of nonpharmacologic coping skills and strategies.    Medication side effects and alternatives reviewed. Health promotion activities recommended and reviewed today. All questions addressed. Education and counseling completed regarding risks and benefits of medications and psychotherapy options. Recommend therapy for additional support.     Treatment Plan:  Continue Celexa/citalopram 20 mg daiy for anxiety, depression, and history of trauma.    Continue alprazolam/Xanax 0.5 mg daily as needed for severe anxiety/panic only. Do not use daily.   Reach out to your cardiologist to discuss possible use of stimulants for ADHD in your case. Let me know if you hear back from you cardiologist. If ok, will start Vyvanse 20 mg daily.   Consider psychotherapy.  Continue all other cares per primary care provider.   Continue all other medications as reviewed per electronic medical record today.   Safety plan reviewed. To the Emergency Department as needed or call after hours crisis line at [phone] or [phone]. [place] Crisis Text Line: Text [place] to 994577  or  Suicide LifeLine Chat: suicidepreventionlifeline.org/chat  Schedule an appointment with me in 6 weeks or sooner as needed.  Call [facility] at [phone] to schedule.  Follow up with primary care provider as planned or sooner if needed for acute medical concerns.  Call the psychiatric nurse line with  medication questions or concerns at [phone].  MyChart may be used to communicate with your provider, but this is not intended to be used for emergencies.    Patient Education:  Discussed risks of stimulant medication including, but not limited to, decreased appetite, risk of tics (and that they may be lasting), trouble sleeping, cardiac risks such as increased heart rate and blood pressure, and rare risk of sudden cardiac death.  Also risk of addiction/tolerance/dependence.    Risks of benzodiazepine (Ativan, Xanax, Klonopin, Valium, etc) use including, but not limited to, sedation, tolerance, risk for addiction/dependence. Do not drink alcohol while taking benzodiazepines due to risk of trouble breathing and potential death. Do not drive or operate heavy machinery until it is known how the drug affects you. Discuss with physician or pharmacist before ever taking a benzodiazepine with a narcotic/opioid pain medication.     Good ADHD resources:  Books-Mastering Adult ADHD, Driven to Distraction, Take Charge of Adult ADHD  Website-www.Buck    ADHD medication expectations:   https://www.28msec/slideshows/lyq-qgqe-uqta-medication-work/    What to Expect and What NOT to Expect from Stimulant Medication  by Ml Snyder, PhD    Many clients taking stimulant medication aren t sure what to expect from it. They may have unrealistic expectations of their medication and decide it s not working. Or they may have become used to the benefits of stimulant medication and think it s no longer working.    Here s a list of things to expect when taking stimulant medications. Of course, everyone has a different reaction and a different level of sensitivity to medication, so some seem to benefit much more clearly than others.    A good response to stimulant medication typically results in:    - Improved attention span - being able to read longer while staying focused; being able to listen longer while staying  focused.    - Reduced distractability - being able to remain focused when some distractions occur around you.    - Greater ease in getting back on task after an interruption.    - Better working memory - i.e., being able to remember the three things you went downstairs to get.    - Easier to start tasks and complete them.    - Reduced feeling of stress and overwhelm.    - Decreased irritability and over-reactivity    - Reduced feelings of restlessness or hyperactivity    - Reduced impulsiveness - less likely to interrupt, to make decisions with little consideration for costs or consequences    However, as Corbin Bae MD, said so memorably,  Pills don t build skills.  You shouldn t expect stimulant medication to help you organize your files, improve your study skills, write your paper, prioritize your tasks, reduce your clutter, or problem-solve better. But it will put your brain in a state where these skills can be more easily learned.    Often, the best pairing is stimulant medication in combination with a therapist, organizer or  that is helping you build the skills you need to succeed now that you re able to focus and concentrate.    Get Out of Your Mind & Into Your Life   By Geremias Singh    The Happiness Trap: How to Stop Struggling and Start Living  By Tyrell Jack    The Reality Slap: Finding Peace & Fulfillment When Life Hurts  By Tyrell Jack    Things Might Go Terribly, Horribly Wrong: A Guide to Life Liberated from Anxiety  By Juana Garza, PhD    Stress Less, Live More: How Acceptance and Commitment Therapy Can Help You Live a Busy Yet Balanced Life  By Sukumar Cole    Finding Life Beyond Trauma: Using Acceptance and Commitment Therapy to Heal From Post-Traumatic Stress and Trauma-Related Problems  By Vanessa Gallardo and Jessica Stroud    Other ACT Skills References     Tyrell Jack on YouTube - Demonstrates several different skills to deal with difficult thoughts and feelings    "  Book:  \"A Liberated Mind: How to Pivot Toward What Matters\" by Geremias Singh     Psychological flexibility: How love turns pain into purpose  Tedx Talk by Dr. Singh discussing his struggle with anxiety and panic disorder which motivated him to develop ACT therapy (Acceptance and Commitment Therapy)     You Are Not Your Thoughts    Care team has reviewed attendance agreement with patient. Patient advised that two failed appointments within 6 months may lead to termination of current episode of care.     Community Resources:    National Suicide Prevention Lifeline: 270.182.8878 (TTY: 965.571.5982). Call anytime for help.  (www.suicidepreventionlifeline.org)  National Olive on Mental Illness (www.geo.org): 809.428.6586 or 537-126-2543.   Mental Health Association (www.mentalhealth.org): 494.388.7147 or 940-675-7774.  [place] Crisis Text Line: Text [place] to 582750  Suicide LifeLine Chat: Post-i.org/chat    Administrative Billing:   Phone Call/Video Duration: 35 Minutes  Start: 2:05p  Stop: 2:40p    The longitudinal plan of care for the diagnosis(es)/condition(s) as documented were addressed during this visit. Due to the added complexity in care, I will continue to support Tariq in the subsequent management and with ongoing continuity of care.    Episode of Care #: 1    Patient Status:  Patient will continue to be seen for ongoing consultation and stabilization.    Signed:   Mehnaz Hull DO  [facility]    Disclaimer: This note consists of symbols derived from keyboarding, dictation and/or voice recognition software. As a result, there may be errors in the script that have gone undetected. Please consider this when interpreting information found in this chart.    "

## 2024-08-30 NOTE — TELEPHONE ENCOUNTER
Patient states his psychiatrist would like to treat him for ADHD with use of stimulants.  Patient would like Dr. Mon to advise on cardiac risk factors/contraindications to this.  Suri Quiñones RN on 8/30/2024 at 3:45 PM
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
## 2024-08-30 NOTE — NURSING NOTE
Current patient location: [address] [place] [id number]    Is the patient currently in the state of [place]? YES    Visit mode:VIDEO    If the visit is dropped, the patient can be reconnected by: VIDEO VISIT: Send to e-mail at: [email or web address]    Will anyone else be joining the visit? NO  (If patient encounters technical issues they should call [phone])    How would you like to obtain your AVS? MyChart    Are changes needed to the allergy or medication list? Pt stated no changes to allergies and Pt stated no med changes    Are refills needed on medications prescribed by this physician? NO    Rooming Documentation:  Questionnaire(s) completed Attendance guidelines (MH&A only)      Reason for visit: Consult    Eileen Hogue [facility]    Care team has reviewed attendance agreement with patient. Patient advised that two failed appointments within 6 months may lead to termination of current episode of care.

## 2024-09-05 ENCOUNTER — MYC MEDICAL ADVICE (OUTPATIENT)
Dept: PSYCHIATRY | Facility: CLINIC | Age: 54
End: 2024-09-05
Payer: COMMERCIAL

## 2024-09-05 DIAGNOSIS — F90.9 ATTENTION DEFICIT HYPERACTIVITY DISORDER (ADHD), UNSPECIFIED ADHD TYPE: Primary | ICD-10-CM

## 2024-09-05 DIAGNOSIS — F41.1 GAD (GENERALIZED ANXIETY DISORDER): ICD-10-CM

## 2024-09-05 DIAGNOSIS — F43.9 TRAUMA AND STRESSOR-RELATED DISORDER: ICD-10-CM

## 2024-09-05 NOTE — TELEPHONE ENCOUNTER
Any Mon DO Lidke, Jen M, RN  Caller: Unspecified (6 days ago,  3:44 PM)  No.  He has CAD and increases risk of recurrent heart attack.     VoteItt message returned to patient.  Suri Quiñones, SHORTY on 9/5/2024 at 1:32 PM

## 2024-09-06 RX ORDER — CLONIDINE HYDROCHLORIDE 0.1 MG/1
TABLET ORAL
Qty: 60 TABLET | Refills: 1 | Status: SHIPPED | OUTPATIENT
Start: 2024-09-06

## 2024-09-14 DIAGNOSIS — E03.9 HYPOTHYROIDISM, UNSPECIFIED TYPE: ICD-10-CM

## 2024-09-16 RX ORDER — LEVOTHYROXINE SODIUM 112 UG/1
TABLET ORAL
Qty: 90 TABLET | Refills: 2 | Status: SHIPPED | OUTPATIENT
Start: 2024-09-16

## 2024-09-24 ENCOUNTER — TRANSFERRED RECORDS (OUTPATIENT)
Dept: HEALTH INFORMATION MANAGEMENT | Facility: CLINIC | Age: 54
End: 2024-09-24
Payer: COMMERCIAL
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
## 2024-10-11 ENCOUNTER — VIRTUAL VISIT (OUTPATIENT)
Dept: BEHAVIORAL HEALTH | Facility: CLINIC | Age: 54
End: 2024-10-11
Payer: COMMERCIAL

## 2024-10-11 DIAGNOSIS — F33.1 MODERATE EPISODE OF RECURRENT MAJOR DEPRESSIVE DISORDER (H): Primary | ICD-10-CM

## 2024-10-11 DIAGNOSIS — F43.10 PTSD (POST-TRAUMATIC STRESS DISORDER): ICD-10-CM

## 2024-10-11 DIAGNOSIS — F90.9 ATTENTION DEFICIT HYPERACTIVITY DISORDER (ADHD), UNSPECIFIED ADHD TYPE: ICD-10-CM

## 2024-10-11 DIAGNOSIS — F41.1 GAD (GENERALIZED ANXIETY DISORDER): ICD-10-CM

## 2024-10-11 PROCEDURE — 99207 PR NO CHARGE LOS: CPT | Mod: 95 | Performed by: COUNSELOR

## 2024-10-11 ASSESSMENT — PATIENT HEALTH QUESTIONNAIRE - PHQ9
10. IF YOU CHECKED OFF ANY PROBLEMS, HOW DIFFICULT HAVE THESE PROBLEMS MADE IT FOR YOU TO DO YOUR WORK, TAKE CARE OF THINGS AT HOME, OR GET ALONG WITH OTHER PEOPLE: NOT DIFFICULT AT ALL
SUM OF ALL RESPONSES TO PHQ QUESTIONS 1-9: 2
SUM OF ALL RESPONSES TO PHQ QUESTIONS 1-9: 2

## 2024-11-12 ENCOUNTER — LAB (OUTPATIENT)
Dept: LAB | Facility: CLINIC | Age: 54
End: 2024-11-12
Payer: COMMERCIAL

## 2024-11-12 DIAGNOSIS — E78.2 MIXED HYPERLIPIDEMIA: ICD-10-CM

## 2024-11-12 DIAGNOSIS — R93.1 ABNORMAL CARDIAC CT ANGIOGRAPHY: ICD-10-CM

## 2024-11-12 DIAGNOSIS — Z98.61 S/P PTCA (PERCUTANEOUS TRANSLUMINAL CORONARY ANGIOPLASTY): ICD-10-CM

## 2024-11-12 LAB
ALT SERPL W P-5'-P-CCNC: 25 U/L (ref 0–70)
CHOLEST SERPL-MCNC: 203 MG/DL
FASTING STATUS PATIENT QL REPORTED: YES
HDLC SERPL-MCNC: 50 MG/DL
LDLC SERPL CALC-MCNC: 114 MG/DL
NONHDLC SERPL-MCNC: 153 MG/DL
TRIGL SERPL-MCNC: 195 MG/DL

## 2024-12-01 DIAGNOSIS — F33.42 MAJOR DEPRESSIVE DISORDER, RECURRENT EPISODE, IN FULL REMISSION (H): ICD-10-CM

## 2024-12-01 DIAGNOSIS — F41.1 GENERALIZED ANXIETY DISORDER: ICD-10-CM

## 2024-12-01 RX ORDER — ALPRAZOLAM 0.5 MG
TABLET ORAL
Qty: 15 TABLET | Refills: 0 | Status: SHIPPED | OUTPATIENT
Start: 2024-12-01

## 2024-12-04 ENCOUNTER — MYC MEDICAL ADVICE (OUTPATIENT)
Dept: PSYCHIATRY | Facility: CLINIC | Age: 54
End: 2024-12-04
Payer: COMMERCIAL

## 2024-12-04 NOTE — TELEPHONE ENCOUNTER
Patient sent MyC message with update on medications and request for a new medication related to a bout of depression.     Last visit: 8/30/24  Next visit was due around mid October.     Routing to provider for review and A to reach out and schedule patient for follow-up.    RN acknowledged patients message and also gave scheduling info.     Donna Mares RN on 12/4/2024 at 4:32 PM

## 2024-12-30 ASSESSMENT — PATIENT HEALTH QUESTIONNAIRE - PHQ9
SUM OF ALL RESPONSES TO PHQ QUESTIONS 1-9: 5
10. IF YOU CHECKED OFF ANY PROBLEMS, HOW DIFFICULT HAVE THESE PROBLEMS MADE IT FOR YOU TO DO YOUR WORK, TAKE CARE OF THINGS AT HOME, OR GET ALONG WITH OTHER PEOPLE: SOMEWHAT DIFFICULT
SUM OF ALL RESPONSES TO PHQ QUESTIONS 1-9: 5
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
## 2024-12-31 ENCOUNTER — VIRTUAL VISIT (OUTPATIENT)
Dept: PSYCHIATRY | Facility: CLINIC | Age: 54
End: 2024-12-31
Payer: COMMERCIAL

## 2024-12-31 ENCOUNTER — VIRTUAL VISIT (OUTPATIENT)
Dept: BEHAVIORAL HEALTH | Facility: CLINIC | Age: 54
End: 2024-12-31
Payer: COMMERCIAL

## 2024-12-31 DIAGNOSIS — F43.10 PTSD (POST-TRAUMATIC STRESS DISORDER): ICD-10-CM

## 2024-12-31 DIAGNOSIS — F43.9 TRAUMA AND STRESSOR-RELATED DISORDER: ICD-10-CM

## 2024-12-31 DIAGNOSIS — F41.1 GAD (GENERALIZED ANXIETY DISORDER): ICD-10-CM

## 2024-12-31 DIAGNOSIS — F33.1 MODERATE EPISODE OF RECURRENT MAJOR DEPRESSIVE DISORDER (H): ICD-10-CM

## 2024-12-31 DIAGNOSIS — F90.9 ATTENTION DEFICIT HYPERACTIVITY DISORDER (ADHD), UNSPECIFIED ADHD TYPE: Primary | ICD-10-CM

## 2024-12-31 PROCEDURE — 99214 OFFICE O/P EST MOD 30 MIN: CPT | Mod: 95 | Performed by: PSYCHIATRY & NEUROLOGY

## 2024-12-31 PROCEDURE — 90832 PSYTX W PT 30 MINUTES: CPT | Mod: 95 | Performed by: COUNSELOR

## 2024-12-31 PROCEDURE — G2211 COMPLEX E/M VISIT ADD ON: HCPCS | Mod: 95 | Performed by: PSYCHIATRY & NEUROLOGY

## 2024-12-31 RX ORDER — LISDEXAMFETAMINE DIMESYLATE 10 MG/1
10 CAPSULE ORAL EVERY MORNING
Qty: 30 CAPSULE | Refills: 0 | Status: SHIPPED | OUTPATIENT
Start: 2024-12-31

## 2024-12-31 RX ORDER — CITALOPRAM HYDROBROMIDE 10 MG/1
TABLET ORAL
Qty: 15 TABLET | Refills: 0 | Status: SHIPPED | OUTPATIENT
Start: 2024-12-31 | End: 2025-01-15

## 2024-12-31 NOTE — PROGRESS NOTES
"Virtual Visit Details    Type of service:  Video Visit     Originating Location (pt. Location): {video visit patient location:706642::\"Home\"}  {PROVIDER LOCATION On-site should be selected for visits conducted from your clinic location or adjoining [facility] hospital, academic office, or other nearby [facility] building. Off-site should be selected for all other provider locations, including home:[address]}  Distant Location (provider location):  {virtual location provider:053804}  Platform used for Video Visit: {Virtual Visit Platforms:066050::\"[employer]\"}  "

## 2024-12-31 NOTE — NURSING NOTE
Is the patient currently in the state of [place]? YES    Current patient location: [address] [place] [id number]    Visit mode:VIDEO    If the visit is dropped, the patient can be reconnected by: VIDEO VISIT: Text to cell phone:   Telephone Information:   Mobile [phone]       Will anyone else be joining the visit? No  (If patient encounters technical issues they should call [phone])    Are changes needed to the allergy or medication list? No    Are refills needed on medications prescribed by this physician? No    Rooming Documentation: Questionnaire(s) completed.    Reason for visit: VALERIY Pettit

## 2024-12-31 NOTE — PATIENT INSTRUCTIONS
Treatment Plan:  Discontinue/taper off Celexa/citalopram: Take 15 mg daily for 5 days then decrease to 10 mg daily for 5 days then decrease to 5 mg daily for 5 days then discontinue.  Continue alprazolam/Xanax 0.5 mg daily as needed for severe anxiety/panic only. Do not use daily.   Continue clonidine 0.05-0.1 mg up to twice daily for ADHD, anxiety, sleep.  Start Vyvanse daily as needed for ADHD: Start with 10 mg daily for at least 3 days then can increase to 20 mg daily for at least 3 days and up to 30 mg daily as tolerated.  Watch for any chest pain, racing heart.  Discussed recommendation to get blood pressure cuff and monitor blood pressure.  Consider psychotherapy.  Continue all other cares per primary care provider.   Continue all other medications as reviewed per electronic medical record today.   Safety plan reviewed. To the Emergency Department as needed or call after hours crisis line at [phone] or [phone]. [place] Crisis Text Line. Text [place] to 557560 or Suicide LifeLine Chat: suicidepreventionlifeline.org/chat  Schedule an appointment with me in 4 weeks or sooner as needed. Call [facility] at [phone] to schedule.  Follow up with primary care provider as planned or for acute medical concerns.  Call the psychiatric nurse line with medication questions or concerns at [phone].  "[employer]hart may be used to communicate with your provider, but this is not intended to be used for emergencies.    Patient Education:  Have previously discussed risks of stimulant medication including, but not limited to, decreased appetite, risk of tics (and that they may be lasting), trouble sleeping, cardiac risks such as increased heart rate and blood pressure, and rare risk of sudden cardiac death.  Also risk of addiction/tolerance/dependence.     Risks of benzodiazepine (Ativan, Xanax, Klonopin, Valium, etc) use including, but not limited to, sedation, tolerance, risk for addiction/dependence. Do  not drink alcohol while taking benzodiazepines due to risk of trouble breathing and potential death. Do not drive or operate heavy machinery until it is known how the drug affects you. Discuss with physician or pharmacist before ever taking a benzodiazepine with a narcotic/opioid pain medication.      Good ADHD resources:  Books-Mastering Adult ADHD, Driven to Distraction, Take Charge of Adult ADHD  Website-www.1-800-DENTIST     ADHD medication expectations:   https://www.[employer]/slideshows/jqv-qfbk-lzow-medication-work/     What to Expect and What NOT to Expect from Stimulant Medication  by Ml Snyder, PhD     Many clients taking stimulant medication aren t sure what to expect from it. They may have unrealistic expectations of their medication and decide it s not working. Or they may have become used to the benefits of stimulant medication and think it s no longer working.     Here s a list of things to expect when taking stimulant medications. Of course, everyone has a different reaction and a different level of sensitivity to medication, so some seem to benefit much more clearly than others.     A good response to stimulant medication typically results in:     - Improved attention span - being able to read longer while staying focused; being able to listen longer while staying focused.     - Reduced distractability - being able to remain focused when some distractions occur around you.     - Greater ease in getting back on task after an interruption.     - Better working memory - i.e., being able to remember the three things you went downstairs to get.     - Easier to start tasks and complete them.     - Reduced feeling of stress and overwhelm.     - Decreased irritability and over-reactivity     - Reduced feelings of restlessness or hyperactivity     - Reduced impulsiveness - less likely to interrupt, to make decisions with little consideration for costs or consequences     However, as [place]bin  "MD Kathia, said so memorably,  Pills don t build skills.  You shouldn t expect stimulant medication to help you organize your files, improve your study skills, write your paper, prioritize your tasks, reduce your clutter, or problem-solve better. But it will put your brain in a state where these skills can be more easily learned.     Often, the best pairing is stimulant medication in combination with a therapist, organizer or  that is helping you build the skills you need to succeed now that you re able to focus and concentrate.     Get Out of Your Mind & Into Your Life   By Geremias Singh     The Happiness Trap: How to Stop Struggling and Start Living  By Tyrell Jack     The Reality Slap: Finding Peace & Fulfillment When Life Hurts  By Tyrell Jack     Things Might Go Terribly, Horribly Wrong: A Guide to Life Liberated from Anxiety  By Juana Garza, PhD     Stress Less, Live More: How Acceptance and Commitment Therapy Can Help You Live a Busy Yet Balanced Life  By Sukumar Cole     Finding Life Beyond Trauma: Using Acceptance and Commitment Therapy to Heal From Post-Traumatic Stress and Trauma-Related Problems  By Vanessa Gallardo and Jessica Stroud     Other ACT Skills References     Tyrell Jack on YouTube - Demonstrates several different skills to deal with difficult thoughts and feelings     Book:  \"A Liberated Mind: How to Pivot Toward What Matters\" by Geremias Singh     Psychological flexibility: How love turns pain into purpose  Tedx Talk by Dr. Singh discussing his struggle with anxiety and panic disorder which motivated him to develop ACT therapy (Acceptance and Commitment Therapy)     You Are Not Your Thoughts     Care team has reviewed attendance agreement with patient. Patient advised that two failed appointments within 6 months may lead to termination of current episode of care.      Community Resources:    National Suicide Prevention Lifeline: 476.818.3604 (TTY: 591.603.4180). Call " anytime for help.  (www.suicidepreventionlifeline.org)  National [place] on Mental Illness (www.geo.org): 593.704.5871 or 642-761-4165.   Mental Health Association (www.mentalhealth.org): 393.631.6756 or 302-785-8884.  [place] Crisis Text Line: Text [place] to 802729  Suicide LifeLine Chat: suicidepreventionlifeline.org/chat

## 2024-12-31 NOTE — PROGRESS NOTES
"Telemedicine Visit: The patient's condition can be safely assessed and treated via synchronous audio and visual telemedicine encounter.      Reason for Telemedicine Visit: Patient has requested telehealth visit    Originating Site (Patient Location): Patient's home    Distant Location (provider location):  Off-site    Consent:  The patient/guardian has verbally consented to: the potential risks and benefits of telemedicine (video visit) versus in person care; bill my insurance or make self-payment for services provided; and responsibility for payment of non-covered services.     Mode of Communication:  Video Conference via [employer]    As the provider I attest to compliance with applicable laws and regulations related to telemedicine.         Outpatient Psychiatric Progress Note    Name: Tariq Mccain   : 1970                    Primary Care Provider: Ej De Leon MD   Therapist: None    PHQ-9 scores:      2024     6:20 PM 10/11/2024     8:41 AM 2024    10:26 PM   PHQ-9 SCORE   PHQ-9 Total Score MyChart 10 (Moderate depression) 2 (Minimal depression) 5 (Mild depression)   PHQ-9 Total Score 10    10 2 5        Patient-reported       SHIVAM-7 scores:      2/15/2024     2:13 PM 2024     6:34 PM   SHIVAM-7 SCORE   Total Score 1 (minimal anxiety) 15 (severe anxiety)   Total Score 1 15    15       Patient Identification:  Patient is a 54 year old,   White Not  or  male  who presents for return visit with me.  Patient is currently  employed . Patient attended the phone/video session alone. Patient prefers to be called: \"Tariq\".    Interim History:  I last saw Tariq Mccain for outpatient psychiatry Consultation on 2024. During that appointment, we:    Continue Celexa/citalopram 20 mg daiy for anxiety, depression, and history of trauma.    Continue alprazolam/Xanax 0.5 mg daily as needed for severe anxiety/panic only. Do not use daily.   Reach out to your " cardiologist to discuss possible use of stimulants for ADHD in your case. Let me know if you hear back from you cardiologist. If ok, will start Vyvanse 20 mg daily.   Consider psychotherapy.    12/31: Patient overall feeling about the same.  Clonidine may be only slightly helpful, difficult to fully assess.  Patient just taking 1 tablet daily in the morning.  Taking it twice a day does cause too much fatigue/sedation.  Patient interested in making a change to Celexa.  Either taper off the medication and try something different or potentially trial stimulant medication.  No acute safety concerns.  No SI.  No problematic drug or alcohol use.  See [facility] note below for additional details.    Per [facility], Hannah Aponte, [facility], during today's team-based visit:  MH update: blaming self for daughters MH.  She transitioned to [facility].  Increased sx and yet feeling hopeful.    Stresses: work stressors, daughters MH, family dynamics   Appetite: n/a  Sleep: n/a  Outpatient Provider updates: previous [facility] referral.  Family therapy [facility].  CM for kiddo.    SI/SIB/HI:  Denies for the 2 months!  TIFFANIE:  Hx of poly sub.  Current ETOH use 1-2 times per week, max of 5.  At daily use was problematic.  Goals to be sober for Jan.   Side effects/compliance:  Interventions:  [facility] engaged in discussion about interpersonal stressors and supports  Most important:  unsure about clonidine. SX increased but dealing okay with daughter being safe    Past Psychiatric Med Trials:  Psych Meds at Intake:  Celexa 20 mg daily - just 20 mg   Xanax 0.5 mg daily prn anxiety (15 tabs last Rxd 8/19/2024, 15 tabs last before that 5/8/2024)     Others: Chantix     Past Psych Meds:  Wellbutrin SR - head felt weird  Naltrexone  Maybe prozac - didn't like it, doesn't really remember the trial   Sertraline - rash    Psychiatric ROS:  See HPI above for all pertinent positives and negatives. Rest of systems negative.     PHQ9 and GAD7 scores were reviewed today if  completed.   Medication side effects:  Fatigue/sedation from clonidine when taken twice a day  Current stressors include: Symptoms and see HPI above  Coping mechanisms and supports include: Family, Hobbies, and Friends    Current medications include:   Current Outpatient Medications   Medication Sig Dispense Refill    ALPRAZolam (XANAX) 0.5 MG tablet TAKE 1 TABLET(0.5 MG) BY MOUTH DAILY AS NEEDED FOR ANXIETY 15 tablet 0    aspirin (ASA) 81 MG chewable tablet Take 1 tablet (81 mg) by mouth daily Starting tomorrow.      atorvastatin (LIPITOR) 40 MG tablet Take 1 tablet (40 mg) by mouth daily. 90 tablet 3    citalopram (CELEXA) 20 MG tablet Take 1 tablet (20 mg) by mouth daily. 90 tablet 0    cloNIDine (CATAPRES) 0.1 MG tablet Take half tab twice daily for 1 week then increase to full tab twice daily as tolerated. 60 tablet 1    clopidogrel (PLAVIX) 75 MG tablet Take 1 tablet (75 mg) by mouth daily 90 tablet 3    levothyroxine (SYNTHROID/LEVOTHROID) 112 MCG tablet TAKE 1 TABLET(112 MCG) BY MOUTH DAILY 90 tablet 2    multivitamin, therapeutic (THERA-VIT) TABS tablet Take 1 tablet by mouth daily      nitroGLYcerin (NITROSTAT) 0.4 MG sublingual tablet For chest pain place 1 tablet under the tongue every 5 minutes for 3 doses. If symptoms persist 5 minutes after 1st dose call 911. 30 tablet 11    omeprazole (PRILOSEC) 40 MG DR capsule Take 40 mg by mouth daily      sildenafil (VIAGRA) 100 MG tablet Take 1 tablet (100 mg) by mouth daily as needed (erectile dysfunction) (Patient not taking: Reported on 11/22/2024) 30 tablet 1     Current Facility-Administered Medications   Medication Dose Route Frequency Provider Last Rate Last Admin    triamcinolone (KENALOG-40) injection 20 mg  20 mg   Gerry Montez MD   20 mg at 07/11/22 1044       The [place] Prescription Monitoring Program has been reviewed and there are no concerns about diversionary activity for controlled substances at this time.   12/02/2024 12/01/2024 1 Alprazolam  0.5 Mg Tablet 15.00 15 Ca [place] [id number] [facility] ([id number]) 0/0 1.00 L[place]   08/19/2024 08/19/2024 1 Alprazolam 0.5 Mg Tablet 15.00 15 Ca Ada [id number] [facility] ([id number]) 0/0 1.00 [place]   05/08/2024 05/08/2024 1 Alprazolam 0.5 Mg Tablet 15.00 15 Ca Ada [id number] [facility] (4829) 0/0 1.00 [place]       Past Medical/Surgical History:  Past Medical History:   Diagnosis Date    Anxiety state, unspecified     Depressive disorder 2002    Status post coronary angiogram 4/9/2024      has a past medical history of Anxiety state, unspecified, Depressive disorder (2002), and Status post coronary angiogram (4/9/2024).    He has no past medical history of Arthritis, Cancer (H), Cerebral infarction (H), Congestive heart failure (H), COPD (chronic obstructive pulmonary disease) (H), Diabetes (H), Heart disease, Hypertension, Thyroid disease, or Uncomplicated asthma.    Social History:  Reviewed. No changes to social history except as noted above in HPI.    Vital Signs:   None. This is phone/video visit.     Labs:  Most recent laboratory results reviewed and the pertinent results include:   Recent Labs   Lab Test 11/12/24  0757 04/09/24  1338   CHOL 203* 240*   HDL 50 41   * 191*   TRIG 195* 42     Lab Results   Component Value Date    ALT 25 11/12/2024         Review of Systems:  10 systems (general, cardiovascular, respiratory, eyes, ENT, endocrine, GI, , M/S, neurological) were reviewed. Most pertinent finding(s) is/are:  denies fever, cough, persistent headaches, shortness of breath, chest pain, severe GI symptoms, trouble urinating, severe pain. The remaining systems are all unremarkable.    Mental Status Examination (limited as this is by phone/video):  Appearance: Awake, alert, appears stated age, no acute distress, well-groomed   Attitude:  cooperative, pleasant   Motor: No gross abnormalities observed via video, not formally tested   Oriented to:  person, place, time, and situation  Attention Span and  Concentration:  normal  Speech:  clear, coherent, regular rate, rhythm, and volume  Language: intact  Mood:   About the same  Affect:  appropriate and in normal range and mood congruent  Associations:  no loose associations  Thought Process:  logical, linear and goal oriented  Thought Content:  no evidence of suicidal ideation or homicidal ideation, no evidence of psychotic thought, no auditory hallucinations present and no visual hallucinations present  Recent and Remote Memory:  Intact to interview. Not formally assessed. No amnesia.  Fund of Knowledge: appropriate  Insight:  good  Judgment:  intact, adequate for safety  Impulse Control:  intact    Suicide Risk Assessment:  Today Tariq Mccain reports no suicidal ideation. Based on all available evidence including the factors cited above, Tariq Mccain does not appear to be at imminent risk for self-harm, does not meet criteria for a 72-hr hold, and therefore remains appropriate for ongoing outpatient level of care.  A thorough assessment of risk factors related to suicide and self-harm have been reviewed and are noted above. The patient convincingly denies suicidality on several occasions. Local community safety resources reviewed for patient to use if needed. There was no deceit detected, and the patient presented in a manner that was believable.     DSM5 Diagnosis:  Attention-Deficit/Hyperactivity Disorder  314.01 (F90.9) Unspecified Attention -Deficit / Hyperactivity Disorder  296.32 (F33.1) Major Depressive Disorder, Recurrent Episode, Moderate _  300.02 (F41.1) Generalized Anxiety Disorder  309.9 (F43.9) Unspecified Trauma and Stressor Related Disorder    Medical comorbidities include:   Patient Active Problem List    Diagnosis Date Noted    Coronary artery disease involving native coronary artery of native heart without angina pectoris 05/15/2024     Priority: Medium    Chronic ulcerative pancolitis with complication (H) 09/15/2022     Priority: Medium     Pastor's esophagus with low grade dysplasia 06/28/2016     Priority: Medium     Formatting of this note might be different from the original.  2016-EGD, low grade dysplasia  S/p EGD with RFA 12/13/16      Alcohol use disorder, moderate, in early remission (H) 07/10/2014     Priority: Medium    Major depressive disorder, recurrent episode, in full remission (H) 06/12/2014     Priority: Medium     Formatting of this note is different from the original.  Psychiatry to Primary Care Hand-over  Psychiatrist: Monica Rodriguez MD   PCP: Ej De Leon MD  Last Visit with Psychiatry:  3/8/2017  Current Medication/s and dose/s: Celexa 20mg  Medications tried and failed/Reason(s) for discontinuation:  Wellbutrin (head felt weird), maybe Prozac (? Didn't like it), Celexa (6 years - no side effects, probably helps).  Sertraline listed as an Allergy.  Recommendations/Plan of care:  Continue Celexa long term.  Recommended Labs Monitoring and Frequency:  n/a  Last PHQ-9:   PHQ-9 12/22/2015 5/12/2015 11/29/2014 11/3/2014 8/21/2014 7/10/2014 6/10/2014   PHQ9 Score - Smartform 3 1 - - 2 8 3   PHQ9 Score - Online Questions - - 1 0 - - -      Kidney stone 02/01/2012     Priority: Medium    Mixed hyperlipidemia 10/20/2009     Priority: Medium    Acquired hypothyroidism 10/20/2009     Priority: Medium    Anxiety disorder 10/20/2009     Priority: Medium       Psychosocial & Contextual Factors: see HPI above    Assessment:  From Intake, 8/30/2024:  Tariq Mccain is a 53-year-old with past psychiatric history including depression, anxiety, trauma, substance abuse in remission who presents today for psychiatric evaluation.  Patient with long history of mental health symptoms dating back to childhood.  Significant history of trauma.  Reports being diagnosed with oppositional defiant disorder as a child.  Struggled significantly in school and started struggling with substance abuse at a young age.  Has not had any substance use  struggles for quite some time.  Was last in chemical dependency treatment about 12 years ago for alcohol use.  Uses alcohol currently but denies any problematic use and only uses a couple times a week.  Patient with several features of ADHD as a child and as an adult that are currently interfering with functioning in at least 2 domains.  Patient's son also diagnosed with ADHD and treated with stimulant medication.  Some chronic low grade depression and anxiety symptoms could be related to undertreated/untreated ADHD.  Discussed changing Celexa to a different antidepressant versus trial with ADHD medication.  Patient did not tolerate Wellbutrin XL in the past and so would consider stimulant medication.  Did have coronary angiogram with stent placement in April of this year.  Patient denies any other diagnoses such as arrhythmias, high blood pressure, cardiac hypertrophy, etc.  Discussed I would want patient to discuss possible use of stimulants with cardiology provider prior to prescribing. If cardiology okay with stimulant use in this patient's case, I would send prescription for Vyvanse 20 mg vs Adderall xr 10 mg to  [facility] [address].  Could consider clonidine or guanfacine as ADHD medication if stimulants not recommended.  Could also consider changing Celexa to Effexor-XR/venlafaxine ER.  No acute safety concerns.  No SI.  No problematic drug or alcohol use.  Patient encouraged to consider individual psychotherapy for additional support and ongoing development of nonpharmacologic coping skills and strategies.     12/31/2024:  Overall feeling about the same.  Ongoing psychosocial stressors.  Wonders about tapering off citalopram and starting something new versus starting stimulant medication.  Clonidine has not been super effective and when taken twice a day makes him feel too fatigued.  Cardiologist was not in overt support of stimulant medication but was not adamantly against it  either.  Cardiac risks have been discussed with the patient and patient would like to move ahead with stimulant medication.  Given his history of stent placement, patient is at slightly higher risk for cardiovascular events with stimulant use.  However, cardiac condition currently under control.  No high blood pressure.  No tachycardia.  Patient agrees to obtain blood pressure cuff to monitor blood pressure.  Patient will monitor for chest pain and racing heart.  Discussed signs and symptoms of elevated blood pressure.  Patient also prefers to wean/taper off Celexa.  No acute safety concerns.  No SI.  No problematic drug or alcohol use.    Medication side effects and alternatives were reviewed. Health promotion activities recommended and reviewed today. All questions addressed. Education and counseling completed regarding risks and benefits of medications and psychotherapy options. Recommend therapy for additional support.     Treatment Plan:  Discontinue/taper off Celexa/citalopram: Take 15 mg daily for 5 days then decrease to 10 mg daily for 5 days then decrease to 5 mg daily for 5 days then discontinue.  Continue alprazolam/Xanax 0.5 mg daily as needed for severe anxiety/panic only. Do not use daily.   Continue clonidine 0.05-0.1 mg up to twice daily for ADHD, anxiety, sleep.  Start Vyvanse daily as needed for ADHD: Start with 10 mg daily for at least 3 days then can increase to 20 mg daily for at least 3 days and up to 30 mg daily as tolerated.  Watch for any chest pain, racing heart.  Discussed recommendation to get blood pressure cuff and monitor blood pressure.  Consider psychotherapy.  Continue all other cares per primary care provider.   Continue all other medications as reviewed per electronic medical record today.   Safety plan reviewed. To the Emergency Department as needed or call after hours crisis line at [phone] or [phone]. [place] Crisis Text Line. Text [place] to 593547 or Suicide LifeLine  Chat: suicidepreventionlifeline.org/chat  Schedule an appointment with me in 4 weeks or sooner as needed. Call [facility] at [phone] to schedule.  Follow up with primary care provider as planned or for acute medical concerns.  Call the psychiatric nurse line with medication questions or concerns at [phone].  MyChart may be used to communicate with your provider, but this is not intended to be used for emergencies.    Patient Education:  Have previously discussed risks of stimulant medication including, but not limited to, decreased appetite, risk of tics (and that they may be lasting), trouble sleeping, cardiac risks such as increased heart rate and blood pressure, and rare risk of sudden cardiac death.  Also risk of addiction/tolerance/dependence.     Risks of benzodiazepine (Ativan, Xanax, Klonopin, Valium, etc) use including, but not limited to, sedation, tolerance, risk for addiction/dependence. Do not drink alcohol while taking benzodiazepines due to risk of trouble breathing and potential death. Do not drive or operate heavy machinery until it is known how the drug affects you. Discuss with physician or pharmacist before ever taking a benzodiazepine with a narcotic/opioid pain medication.      Good ADHD resources:  Books-Mastering Adult ADHD, Driven to Distraction, Take Charge of Adult ADHD  Website-www.Gamelet     ADHD medication expectations:   https://www.Uber.com/slideshows/xvv-xhfq-sssr-medication-work/     What to Expect and What NOT to Expect from Stimulant Medication  by Ml Snyder, PhD     Many clients taking stimulant medication aren t sure what to expect from it. They may have unrealistic expectations of their medication and decide it s not working. Or they may have become used to the benefits of stimulant medication and think it s no longer working.     Here s a list of things to expect when taking stimulant medications. Of course, everyone has a  different reaction and a different level of sensitivity to medication, so some seem to benefit much more clearly than others.     A good response to stimulant medication typically results in:     - Improved attention span - being able to read longer while staying focused; being able to listen longer while staying focused.     - Reduced distractability - being able to remain focused when some distractions occur around you.     - Greater ease in getting back on task after an interruption.     - Better working memory - i.e., being able to remember the three things you went downstairs to get.     - Easier to start tasks and complete them.     - Reduced feeling of stress and overwhelm.     - Decreased irritability and over-reactivity     - Reduced feelings of restlessness or hyperactivity     - Reduced impulsiveness - less likely to interrupt, to make decisions with little consideration for costs or consequences     However, as Corbin Bae MD, said so memorably,  Pills don t build skills.  You shouldn t expect stimulant medication to help you organize your files, improve your study skills, write your paper, prioritize your tasks, reduce your clutter, or problem-solve better. But it will put your brain in a state where these skills can be more easily learned.     Often, the best pairing is stimulant medication in combination with a therapist, organizer or  that is helping you build the skills you need to succeed now that you re able to focus and concentrate.     Get Out of Your Mind & Into Your Life   By Geremias Singh     The Happiness Trap: How to Stop Struggling and Start Living  By Tyrell Jack     The Reality Slap: Finding Peace & Fulfillment When Life Hurts  By Tyrell Jack     Things Might Go Terribly, Horribly Wrong: A Guide to Life Liberated from Anxiety  By Juana Garza, PhD     Stress Less, Live More: How Acceptance and Commitment Therapy Can Help You Live a Busy Yet Balanced Life  By Sukumar Cole    "  Finding Life Beyond Trauma: Using Acceptance and Commitment Therapy to Heal From Post-Traumatic Stress and Trauma-Related Problems  By Vanessa Gallardo and Jessica Stroud     Other ACT Skills References     Tyrell Jack on YouTube - Demonstrates several different skills to deal with difficult thoughts and feelings     Book:  \"A Liberated Mind: How to Pivot Toward What Matters\" by Geremias Singh     Psychological flexibility: How love turns pain into purpose  Tedx Talk by Dr. Singh discussing his struggle with anxiety and panic disorder which motivated him to develop ACT therapy (Acceptance and Commitment Therapy)     You Are Not Your Thoughts     Care team has reviewed attendance agreement with patient. Patient advised that two failed appointments within 6 months may lead to termination of current episode of care.      Community Resources:    National Suicide Prevention Lifeline: 284.931.1665 (TTY: 453.918.2443). Call anytime for help.  (www.suicidepreventionlifeline.org)  National [place] on Mental Illness (www.geo.org): 547.847.7500 or 598-478-2322.   Mental Health Association (www.mentalhealth.org): 563.602.3685 or 163-747-2112.  [place] Crisis Text Line: Text [place] to 116854  Suicide LifeLine Chat: suicideDBJ Financial Services.org/chat    Administrative Billing:   Phone Call/Video Duration: 17 Minutes  Start: 10:32a  Stop: 10:49a    The longitudinal plan of care for the diagnosis(es)/condition(s) as documented were addressed during this visit. Due to the added complexity in care, I will continue to support Tariq in the subsequent management and with ongoing continuity of care.    Episode of Care #: 2    Patient Status:  Patient will continue to be seen for ongoing consultation and stabilization.    Signed:   Mehnaz Hull DO  [facility]    Disclaimer: This note consists of symbols derived from keyboarding, dictation and/or voice recognition software. As a result, there may be errors in the script " that have gone undetected. Please consider this when interpreting information found in this chart.

## 2025-01-29 ENCOUNTER — MYC REFILL (OUTPATIENT)
Dept: PSYCHIATRY | Facility: CLINIC | Age: 55
End: 2025-01-29
Payer: COMMERCIAL

## 2025-01-29 DIAGNOSIS — F41.1 GAD (GENERALIZED ANXIETY DISORDER): ICD-10-CM

## 2025-01-29 DIAGNOSIS — F90.9 ATTENTION DEFICIT HYPERACTIVITY DISORDER (ADHD), UNSPECIFIED ADHD TYPE: ICD-10-CM

## 2025-01-29 DIAGNOSIS — F43.9 TRAUMA AND STRESSOR-RELATED DISORDER: ICD-10-CM

## 2025-01-29 DIAGNOSIS — F33.1 MODERATE EPISODE OF RECURRENT MAJOR DEPRESSIVE DISORDER (H): ICD-10-CM

## 2025-01-29 RX ORDER — CITALOPRAM HYDROBROMIDE 20 MG/1
20 TABLET ORAL DAILY
Qty: 90 TABLET | Refills: 0 | Status: CANCELLED | OUTPATIENT
Start: 2025-01-29

## 2025-01-29 RX ORDER — CLONIDINE HYDROCHLORIDE 0.1 MG/1
0.1 TABLET ORAL 2 TIMES DAILY
Qty: 60 TABLET | Refills: 1 | Status: SHIPPED | OUTPATIENT
Start: 2025-01-29

## 2025-01-29 RX ORDER — CITALOPRAM HYDROBROMIDE 10 MG/1
5 TABLET ORAL DAILY
Qty: 15 TABLET | Refills: 1 | Status: SHIPPED | OUTPATIENT
Start: 2025-01-29

## 2025-01-29 NOTE — TELEPHONE ENCOUNTER
Patient stating he is continuing on Citalopram for one more round and then he will discontinue.     Requesting a refill. Was supposed to discontinue on 12/31/24.     Discontinue/taper off Celexa/citalopram: Take 15 mg daily for 5 days then decrease to 10 mg daily for 5 days then decrease to 5 mg daily for 5 days then discontinue.  Continue alprazolam/Xanax 0.5 mg daily as needed for severe anxiety/panic only. Do not use daily.   Continue clonidine 0.05-0.1 mg up to twice daily for ADHD, anxiety, sleep.  Start Vyvanse daily as needed for ADHD: Start with 10 mg daily for at least 3 days then can increase to 20 mg daily for at least 3 days and up to 30 mg daily as tolerated.  Watch for any chest pain, racing heart.  Discussed recommendation to get blood pressure cuff and monitor blood pressure.    Juliana Fleming RN on 1/29/2025 at 2:25 PM

## 2025-01-29 NOTE — TELEPHONE ENCOUNTER
1) Reviewed refill request(s) from    [id number] - [place], [place] - [address] AT [facility] & [address] [place] [address]      2) Any Controlled Substance(s)? No    3) Refill(s) requested for:     - citalopram (CELEXA) 10 MG tablet  Date last ordered: 12/31/2024 Qty: 15 Refills: 0   this order was discontinued on 12/31/2024      4) Action taken: routed encounter back to RN Pool for review; not within LPN/MA Scope of Practice to deny. [employer] message sent to pt asking if he is taking medication as it was d/c'd in December      Cata Lopez MA on 1/29/2025 at 1:32 PM

## 2025-01-29 NOTE — TELEPHONE ENCOUNTER
Date of Last Office Visit: 12/31/2024  Date of Next Office Visit:  1/31/2025  No shows since last visit: No  More than one patient-initiated cancellation (with reschedule) since last seen in clinic? No    []Medication refilled per  Medication Refill in Ambulatory Care  policy.  [x]Medication unable to be refilled by RN due to criteria not met as indicated below:    []Eligibility: has not had a provider visit within last 6 months   []Supervision: no future appointment; < 7 days before next appointment   []Compliance: no shows; cancellations; lapse in therapy   []Verification: order discrepancy; may need modification...   [] > 30-day supply request   []Advanced refill request: > 7 days before refill date   []Controlled medication   []Medication not included in policy   []Review: new med; med adjusted <= 30 days; safety alert; requires lab monitoring...   [x]Scope of Practice: refill request processed by LPN/MA   []Other:      Medication(s) requested:     -  cloNIDine (CATAPRES) 0.1 MG tablet   Date last ordered: 9/6/2024  Qty: 60  Refills: 1  Appropriate for refill? Provider to review.        Any Controlled Substance(s)? No      Requested medication(s) verified as identical to current order? Yes    Any lapse in adherence to medication(s) greater than 5 days? Unknown     Additional action taken? routed encounter to provider for review.      Last visit treatment plan:   Treatment Plan:  Discontinue/taper off Celexa/citalopram: Take 15 mg daily for 5 days then decrease to 10 mg daily for 5 days then decrease to 5 mg daily for 5 days then discontinue.  Continue alprazolam/Xanax 0.5 mg daily as needed for severe anxiety/panic only. Do not use daily.   Continue clonidine 0.05-0.1 mg up to twice daily for ADHD, anxiety, sleep.  Start Vyvanse daily as needed for ADHD: Start with 10 mg daily for at least 3 days then can increase to 20 mg daily for at least 3 days and up to 30 mg daily as tolerated.  Watch for any chest pain,  racing heart.  Discussed recommendation to get blood pressure cuff and monitor blood pressure.  Consider psychotherapy.  Continue all other cares per primary care provider.   Continue all other medications as reviewed per electronic medical record today.   Safety plan reviewed. To the Emergency Department as needed or call after hours crisis line at [phone] or [phone]. [place] Crisis Text Line. Text [place] to 650584 or Suicide LifeLine Chat: suicidepreventionlifeline.org/chat  Schedule an appointment with me in 4 weeks or sooner as needed. Call [facility] at [phone] to schedule.  Follow up with primary care provider as planned or for acute medical concerns.  Call the psychiatric nurse line with medication questions or concerns at [phone].  nVoqhart may be used to communicate with your provider, but this is not intended to be used for emergencies.    Any medication(s) require lab monitoring? No

## 2025-02-24 ENCOUNTER — MYC REFILL (OUTPATIENT)
Dept: PSYCHIATRY | Facility: CLINIC | Age: 55
End: 2025-02-24
Payer: COMMERCIAL

## 2025-02-24 DIAGNOSIS — F90.9 ATTENTION DEFICIT HYPERACTIVITY DISORDER (ADHD), UNSPECIFIED ADHD TYPE: ICD-10-CM

## 2025-02-25 RX ORDER — LISDEXAMFETAMINE DIMESYLATE 20 MG/1
20 CAPSULE ORAL EVERY MORNING
Qty: 30 CAPSULE | Refills: 0 | OUTPATIENT
Start: 2025-02-25

## 2025-02-26 RX ORDER — LISDEXAMFETAMINE DIMESYLATE 20 MG/1
20 CAPSULE ORAL EVERY MORNING
Qty: 30 CAPSULE | Refills: 0 | Status: SHIPPED | OUTPATIENT
Start: 2025-02-26

## 2025-02-26 NOTE — TELEPHONE ENCOUNTER
As per  review, Lisdexamfetamine 10 Mg Capsule was last sold on 1/2/2025 ( Rx from 12/31/2024)    Pt is having difficulties with filling prescriptions at [facility] and asking to re-send Vyvanse to [facility] on [facility]    Made a phone call to [facility]. They do not have any open prescriptions for Vyvanse. They were unable to locate the Rx from 2/7/2025  Confirmed that last fill was for Vyvanse 10 mg on January 2nd.      This medication was last prescribed on 2/7/2025 for Qty of 30 with 0 refill(s).      Disp Refills Start End NEELIMA   lisdexamfetamine (VYVANSE) 20 MG capsule 30 capsule 0 2/7/2025 -- No   Sig - Route: Take 1 capsule (20 mg) by mouth every morning. - Oral   Sent to pharmacy as: Lisdexamfetamine Dimesylate 20 MG Oral Capsule (VYVANSE)   Class: E-Prescribe       Date of Last Office Visit: 1/31/2025  Date of Next Office Visit: 3/14/2025          Please review and sign if appropriate.

## 2025-03-04 ENCOUNTER — MYC MEDICAL ADVICE (OUTPATIENT)
Dept: PSYCHIATRY | Facility: CLINIC | Age: 55
End: 2025-03-04
Payer: COMMERCIAL

## 2025-03-26 ENCOUNTER — MYC MEDICAL ADVICE (OUTPATIENT)
Dept: PSYCHIATRY | Facility: CLINIC | Age: 55
End: 2025-03-26
Payer: COMMERCIAL

## 2025-03-26 DIAGNOSIS — F41.1 GAD (GENERALIZED ANXIETY DISORDER): Primary | ICD-10-CM

## 2025-03-26 DIAGNOSIS — F33.41 RECURRENT MAJOR DEPRESSIVE DISORDER, IN PARTIAL REMISSION: ICD-10-CM

## 2025-03-26 NOTE — TELEPHONE ENCOUNTER
Patient said he has not been able to  the Vyvanse 30 mg yet due to it being out of stock. They should have it in shortly.     He is also complaining of low libido since starting the Vyvanse and prozac and wondering if it could be related to the medications.     Last visit: Vyvanse working out well and well tolerated at 20 mg daily dose. Patient could use slightly better control of ADHD symptoms. We will further optimize Vyvanse to 30 mg daily. Patient denies chest pain or any sustained racing heart. Patient would like to stay on fluoxetine 10 mg daily as it has been helpful for mood and anxiety.     Juliana Fleming RN on 3/26/2025 at 10:37 AM

## 2025-03-27 ENCOUNTER — TELEPHONE (OUTPATIENT)
Dept: PSYCHIATRY | Facility: CLINIC | Age: 55
End: 2025-03-27
Payer: COMMERCIAL

## 2025-03-27 RX ORDER — VILAZODONE HYDROCHLORIDE 10 MG/1
10 TABLET ORAL DAILY
Qty: 30 TABLET | Refills: 1 | Status: SHIPPED | OUTPATIENT
Start: 2025-03-27

## 2025-03-27 NOTE — TELEPHONE ENCOUNTER
Prior Authorization Retail Medication Request    Medication/Dose: vilazodone (VIIBRYD) 10 MG TABS tablet  Diagnosis and ICD code (if different than what is on RX):      SHIVAM (generalized anxiety disorder) [F41.1]  - Primary  Recurrent major depressive disorder, in partial remission [F33.41]        New/renewal/insurance change PA/secondary ins. PA:  Previously Tried and Failed:    Rationale:      Insurance   Primary:   Insurance ID:      Secondary (if applicable):  Insurance ID:      Pharmacy Information (if different than what is on RX)  Name:    Phone:    Fax:    Clinic Information  Preferred routing pool for dept communication: Psych RN Pool.      Tony Salgado RN on 3/27/2025 at 2:05 PM

## 2025-03-27 NOTE — TELEPHONE ENCOUNTER
RN received notification through Right Fax that this patients Vilazadone 10 mg tablet in not formulary and a alternative is being requested.  In addition, the document indicted a Priopr Authorization is needed. The document is as follows:            RN reviewed the document and routed it to Dr. Hull for her awareness.  RN will place the original document in Dr. Hull's mailbox.    2.  RN will initiate a Prior Authorization request encounter for this medication.        Tony Salgado RN on 3/27/2025 at 2:01 PM

## 2025-03-28 ENCOUNTER — MYC MEDICAL ADVICE (OUTPATIENT)
Dept: PSYCHIATRY | Facility: CLINIC | Age: 55
End: 2025-03-28
Payer: COMMERCIAL

## 2025-03-28 DIAGNOSIS — F90.9 ATTENTION DEFICIT HYPERACTIVITY DISORDER (ADHD), UNSPECIFIED ADHD TYPE: ICD-10-CM

## 2025-03-29 NOTE — TELEPHONE ENCOUNTER
Retail Pharmacy Prior Authorization Team   Phone: [phone]    PA Initiation    Medication: VILAZODONE HCL 10 MG PO TABS  Insurance Company: [employer] - Phone [phone] Fax [phone]  Pharmacy Filling the Rx: [id number] [place], [place] - [address]  Filling Pharmacy Phone: [phone]  Filling Pharmacy Fax:    Start Date: 3/29/2025    Note: Due to record-high volumes, our turn-around time is taking longer than usual . We are currently 3  business days behind in the pools.   We are working diligently to submit all requests in a timely manner and in the order they are received. Please only flag TRUE URGENT requests as high priority to the pool at this time.   If you have questions on status of PA's,  please send a note/message in the active PA encounter and send back to the [facility] PA pool [[id number]].    If you have questions about the turn-around time or about our process, please reach out to our supervisor Tomasa Lerner.   Thank you!   RPPA (Retail Pharmacy Prior Authorization) team    [id number]

## 2025-03-31 RX ORDER — LISDEXAMFETAMINE DIMESYLATE 30 MG/1
30 CAPSULE ORAL DAILY
Qty: 30 CAPSULE | Refills: 0 | Status: SHIPPED | OUTPATIENT
Start: 2025-03-31

## 2025-03-31 NOTE — TELEPHONE ENCOUNTER
Patient has been unable to find the Vyvanse 30 mg. He did find the chewable Vyvanse 30 mg and wondering if those would work the same.     He would like it sent to [facility] at [address], [place]      lisdexamfetamine (VYVANSE) 30 MG capsule 30 capsule 0 3/14/2025 4/13/2025 --   Sig - Route: Take 1 capsule (30 mg) by mouth daily.     Juliana Fleming RN on 3/31/2025 at 12:31 PM

## 2025-03-31 NOTE — TELEPHONE ENCOUNTER
Patient did find the Vyvanse 30 mg capsules. He would like it sent to the  pharmacy in [place].     Juliana Fleming RN on 3/31/2025 at 3:01 PM

## 2025-03-31 NOTE — TELEPHONE ENCOUNTER
Prior Authorization Approval    Medication: VILAZODONE HCL 10 MG PO TABS  Authorization Effective Date: 2/28/2025  Authorization Expiration Date: 3/30/2026  Approved Dose/Quantity:   Reference #:     Insurance Company: [employer] - Phone [phone] Fax [phone]  Expected CoPay: $    CoPay Card Available:      Financial Assistance Needed:   [id number] [place] [address]  Pharmacy Notified: Yes  Patient Notified: **Instructed pharmacy to notify patient when script is ready to /ship.**

## 2025-04-20 ENCOUNTER — MYC MEDICAL ADVICE (OUTPATIENT)
Dept: PSYCHIATRY | Facility: CLINIC | Age: 55
End: 2025-04-20
Payer: COMMERCIAL

## 2025-04-21 DIAGNOSIS — I25.10 CORONARY ARTERY DISEASE INVOLVING NATIVE CORONARY ARTERY OF NATIVE HEART WITHOUT ANGINA PECTORIS: Primary | ICD-10-CM

## 2025-04-28 ENCOUNTER — MYC REFILL (OUTPATIENT)
Dept: INTERNAL MEDICINE | Facility: CLINIC | Age: 55
End: 2025-04-28
Payer: COMMERCIAL

## 2025-04-28 DIAGNOSIS — N52.9 ERECTILE DYSFUNCTION, UNSPECIFIED ERECTILE DYSFUNCTION TYPE: ICD-10-CM

## 2025-04-28 RX ORDER — SILDENAFIL 100 MG/1
100 TABLET, FILM COATED ORAL DAILY PRN
Qty: 30 TABLET | Refills: 0 | Status: SHIPPED | OUTPATIENT
Start: 2025-04-28

## 2025-05-05 ENCOUNTER — MYC MEDICAL ADVICE (OUTPATIENT)
Dept: PSYCHIATRY | Facility: CLINIC | Age: 55
End: 2025-05-05
Payer: COMMERCIAL

## 2025-05-05 DIAGNOSIS — F41.1 GENERALIZED ANXIETY DISORDER: ICD-10-CM

## 2025-05-05 DIAGNOSIS — F33.42 MAJOR DEPRESSIVE DISORDER, RECURRENT EPISODE, IN FULL REMISSION: ICD-10-CM

## 2025-05-06 RX ORDER — ALPRAZOLAM 0.5 MG
TABLET ORAL
Qty: 15 TABLET | Refills: 0 | Status: SHIPPED | OUTPATIENT
Start: 2025-05-06

## 2025-05-06 NOTE — TELEPHONE ENCOUNTER
Date of Last Office Visit: 3/14/25   Date of Next Office Visit:  6/9/25  No shows since last visit: No  More than one patient-initiated cancellation (with reschedule) since last seen in clinic? No    []Medication refilled per  Medication Refill in Ambulatory Care  policy.  []Scope of Practice: refill request processed by LPN/MA  [x]Medication unable to be refilled by RN due to criteria not met as indicated below:    []Eligibility: has not had a provider visit within last 6 months   []Supervision: no future appointment; < 7 days before next appointment   []Compliance: no shows; cancellations; lapse in therapy   []Verification: order discrepancy; may need modification...   [] > 30-day supply request   []Advanced refill request: > 7 days before refill date   [x]Controlled medication   []Medication not included in policy   []Review: new med; med adjusted <= 30 days; safety alert; requires lab monitoring...   []Other:      Medication(s) requested:     -  ALPRAZolam (XANAX) 0.5 MG tablet   Date last ordered: 12/1/25  Qty: 15  Refills: 0  Appropriate for refill? Provider to review.            Any Controlled Substance(s)? Yes   [place]  checked? N/A      Requested medication(s) verified as identical to current order? Yes    Any lapse in adherence to medication(s) greater than 5 days? No      Additional action taken? routed encounter to provider for review.      Last visit treatment plan:   Treatment Plan:  Continue alprazolam/Xanax 0.5 mg daily as needed for severe anxiety/panic only. Do not use daily.   Continue fluoxetine/Prozac 10 mg daily for anxiety, mood.  Increase Vyvanse to 30 mg daily as needed for ADHD.  Consider psychotherapy if needed.  Continue all other cares per primary care provider.   Continue all other medications as reviewed per electronic medical record today.   Safety plan reviewed. To the Emergency Department as needed or call after hours crisis line at [phone] or [phone]. [place] Crisis Text  Line. Text [place] to 860029 or Suicide LifeLine Chat: suicidepreventionlifeline.org/chat  Schedule an appointment with me in 3 months or sooner as needed. Call [facility] at [phone] to schedule.  Follow up with primary care provider as planned or for acute medical concerns.  Call the psychiatric nurse line with medication questions or concerns at [phone].  MyChart may be used to communicate with your provider, but this is not intended to be used for emergencies.       Any medication(s) require lab monitoring? No    Tony Salgado RN on 5/6/2025 at 10:33 AM

## 2025-05-20 ENCOUNTER — MYC MEDICAL ADVICE (OUTPATIENT)
Dept: PSYCHIATRY | Facility: CLINIC | Age: 55
End: 2025-05-20
Payer: COMMERCIAL

## 2025-06-09 ENCOUNTER — VIRTUAL VISIT (OUTPATIENT)
Dept: PSYCHIATRY | Facility: CLINIC | Age: 55
End: 2025-06-09
Payer: COMMERCIAL

## 2025-06-09 DIAGNOSIS — F43.9 TRAUMA AND STRESSOR-RELATED DISORDER: ICD-10-CM

## 2025-06-09 DIAGNOSIS — F33.42 MAJOR DEPRESSIVE DISORDER, RECURRENT EPISODE, IN FULL REMISSION: Primary | ICD-10-CM

## 2025-06-09 DIAGNOSIS — F90.9 ATTENTION DEFICIT HYPERACTIVITY DISORDER (ADHD), UNSPECIFIED ADHD TYPE: ICD-10-CM

## 2025-06-09 DIAGNOSIS — F41.1 GAD (GENERALIZED ANXIETY DISORDER): ICD-10-CM

## 2025-06-09 PROCEDURE — 98006 SYNCH AUDIO-VIDEO EST MOD 30: CPT | Performed by: PSYCHIATRY & NEUROLOGY

## 2025-06-09 PROCEDURE — G2211 COMPLEX E/M VISIT ADD ON: HCPCS | Performed by: PSYCHIATRY & NEUROLOGY

## 2025-06-09 RX ORDER — LISDEXAMFETAMINE DIMESYLATE 30 MG/1
30 CAPSULE ORAL DAILY
Qty: 30 CAPSULE | Refills: 0 | Status: SHIPPED | OUTPATIENT
Start: 2025-07-09 | End: 2025-08-08

## 2025-06-09 RX ORDER — LISDEXAMFETAMINE DIMESYLATE 30 MG/1
30 CAPSULE ORAL DAILY
Qty: 30 CAPSULE | Refills: 0 | Status: SHIPPED | OUTPATIENT
Start: 2025-08-08 | End: 2025-09-07

## 2025-06-09 RX ORDER — LISDEXAMFETAMINE DIMESYLATE 30 MG/1
30 CAPSULE ORAL DAILY
Qty: 30 CAPSULE | Refills: 0 | Status: SHIPPED | OUTPATIENT
Start: 2025-06-09 | End: 2025-07-09

## 2025-06-09 RX ORDER — ALPRAZOLAM 0.5 MG
0.5 TABLET ORAL DAILY PRN
Qty: 15 TABLET | Refills: 0 | Status: SHIPPED | OUTPATIENT
Start: 2025-06-09

## 2025-06-09 RX ORDER — FLUOXETINE 10 MG/1
10 CAPSULE ORAL DAILY
Qty: 90 CAPSULE | Refills: 1 | Status: SHIPPED | OUTPATIENT
Start: 2025-06-09

## 2025-06-09 ASSESSMENT — PAIN SCALES - GENERAL: PAINLEVEL_OUTOF10: NO PAIN (0)

## 2025-06-09 NOTE — PATIENT INSTRUCTIONS
Treatment Plan:  Continue alprazolam/Xanax 0.5 mg daily as needed for severe anxiety/panic only. Do not use daily.   Continue fluoxetine/Prozac 10 mg daily for anxiety, mood.  Discussed okay to take on as needed basis if only using for premature ejaculation.  Continue Vyvanse 30 mg daily as needed for ADHD.  Consider psychotherapy if needed.  Continue all other cares per primary care provider.   Continue all other medications as reviewed per electronic medical record today.   Safety plan reviewed. To the Emergency Department as needed or call after hours crisis line at [phone] or [phone]. [place] Crisis Text Line. Text [place] to 648881 or Suicide LifeLine Chat: suicidepreventionMill River Labsline.org/chat  Schedule an appointment with me in 3 months or sooner as needed. Call [facility] at [phone] to schedule.  Follow up with primary care provider as planned or for acute medical concerns.  Call the psychiatric nurse line with medication questions or concerns at [phone].  LibriLoopt may be used to communicate with your provider, but this is not intended to be used for emergencies.    Patient Education:  Have previously discussed risks of stimulant medication including, but not limited to, decreased appetite, risk of tics (and that they may be lasting), trouble sleeping, cardiac risks such as increased heart rate and blood pressure, and rare risk of sudden cardiac death.  Also risk of addiction/tolerance/dependence.     Risks of benzodiazepine (Ativan, Xanax, Klonopin, Valium, etc) use including, but not limited to, sedation, tolerance, risk for addiction/dependence. Do not drink alcohol while taking benzodiazepines due to risk of trouble breathing and potential death. Do not drive or operate heavy machinery until it is known how the drug affects you. Discuss with physician or pharmacist before ever taking a benzodiazepine with a narcotic/opioid pain medication.      Good ADHD  resources:  Books-Mastering Adult ADHD, Driven to Distraction, Take Charge of Adult ADHD  Website-www.Previstar     ADHD medication expectations:   https://www.Schedulicity.Vouchr/slideshows/nki-molf-wiis-medication-work/     What to Expect and What NOT to Expect from Stimulant Medication  by Ml Snyder, PhD     Many clients taking stimulant medication aren t sure what to expect from it. They may have unrealistic expectations of their medication and decide it s not working. Or they may have become used to the benefits of stimulant medication and think it s no longer working.     Here s a list of things to expect when taking stimulant medications. Of course, everyone has a different reaction and a different level of sensitivity to medication, so some seem to benefit much more clearly than others.     A good response to stimulant medication typically results in:     - Improved attention span - being able to read longer while staying focused; being able to listen longer while staying focused.     - Reduced distractability - being able to remain focused when some distractions occur around you.     - Greater ease in getting back on task after an interruption.     - Better working memory - i.e., being able to remember the three things you went downstairs to get.     - Easier to start tasks and complete them.     - Reduced feeling of stress and overwhelm.     - Decreased irritability and over-reactivity     - Reduced feelings of restlessness or hyperactivity     - Reduced impulsiveness - less likely to interrupt, to make decisions with little consideration for costs or consequences     However, as Corbin Bae MD, said so memorably,  Pills don t build skills.  You shouldn t expect stimulant medication to help you organize your files, improve your study skills, write your paper, prioritize your tasks, reduce your clutter, or problem-solve better. But it will put your brain in a state where these skills can be more  "easily learned.     Often, the best pairing is stimulant medication in combination with a therapist, organizer or  that is helping you build the skills you need to succeed now that you re able to focus and concentrate.     Get Out of Your Mind & Into Your Life   By Geremias Singh     The Happiness Trap: How to Stop Struggling and Start Living  By Tyrell Jack     The Reality Slap: Finding Peace & Fulfillment When Life Hurts  By Tyrell Jack     Things Might Go Terribly, Horribly Wrong: A Guide to Life Liberated from Anxiety  By Juana Garza, PhD     Stress Less, Live More: How Acceptance and Commitment Therapy Can Help You Live a Busy Yet Balanced Life  By Sukumar Cole     Finding Life Beyond Trauma: Using Acceptance and Commitment Therapy to Heal From Post-Traumatic Stress and Trauma-Related Problems  By Vanessa Gallardo and Jessica Stroud     Other ACT Skills References     Tyrell Jack on [employer] - Demonstrates several different skills to deal with difficult thoughts and feelings     Book:  \"A Liberated Mind: How to Pivot Toward What Matters\" by Geremias Singh     Psychological flexibility: How love turns pain into purpose  Tedx Talk by Dr. Singh discussing his struggle with anxiety and panic disorder which motivated him to develop ACT therapy (Acceptance and Commitment Therapy)     You Are Not Your Thoughts     Care team has reviewed attendance agreement with patient. Patient advised that two failed appointments within 6 months may lead to termination of current episode of care.      Community Resources:    National Suicide Prevention Lifeline: 123.435.1641 (TTY: 275.368.5206). Call anytime for help.  (www.suicidepreventionlifeline.org)  National [place] [employer]ness (www.geo.org): 462.223.2163 or 338-650-9000.   Mental Health Association (www.mentalhealth.org): 435.720.2251 or 882-300-7541.  [place] Crisis Text Line: Text [place] to 778304  Suicide LifeLine Chat: " "suicidepreventionlifeline.org/chat      Patient Education   Collaborative Care Psychiatry Service  What to Expect  Here's what to expect from your Collaborative Care Psychiatry Service (CCPS).   About CCPS  CCPS means 2 people work together to help you get better. You'll meet with a behavioral health clinician and a psychiatric doctor. A behavioral health clinician helps people with mental health problems by talking with them. A psychiatric doctor helps people by giving them medicine.  How it works  At every visit, you'll see the behavioral health clinician (BHC) first. They'll talk with you about how you're doing and teach you how to feel better.   Then you'll see the psychiatric doctor. This doctor can help you deal with troubling thoughts and feelings by giving you medicine. They'll make sure you know the plan for your care.   CCPS usually takes 3 to 6 visits. If you need more visits, we may have you start seeing a different psychiatric doctor for ongoing care.  If you have any questions or concerns, we'll be glad to talk with you.  About visits  Be open  At your visits, please talk openly about your problems. It may feel hard, but it's the best way for us to help you.  Cancelling visits  If you can't come to your visit, please call us right away at [phone]. If you don't cancel at least 24 hours (1 full day) before your visit, that's \"late cancellation.\"  Being late to visits  Being very late is the same as not showing up. You will be a \"no show\" if:  Your appointment starts with a BHC, and you're more than 15 minutes late for a 30-minute (half hour) visit. This will also cancel your appointment with the psychiatric doctor.  Your appointment is with a psychiatric doctor only, and you're more than 15 minutes late for a 30-minute (half hour) visit.  Your appointment is with a psychiatric doctor only, and you're more than 30 minutes late for a 60-minute (full hour) visit.  If you cancel late or don't show up " 2 times within 6 months, we may end your care.   Getting help between visits  If you need help between visits, you can call us Monday to Friday from 8 a.m. to 4:30 p.m. at [phone].  Emergency care  Call 911 or go to the nearest emergency department if your life or someone else's life is in danger.  Call 988 anytime to reach the national Suicide and Crisis hotline.  Medicine refills  To refill your medicine, call your pharmacy. You can also call [facility] at [phone], Monday to Friday, 8 a.m. to 4:30 p.m. It can take 1 to 3 business days to get a refill.   Forms, letters, and tests  You may have papers to fill out, like FMLA, short-term disability, and workability. We can help you with these forms at your visits, but you must have an appointment. You may need more than 1 visit for this, to be in an intensive therapy program, or both.  Before we can give you medicine for ADHD, we may refer you to get tested for it or confirm it another way.  We may not be able to give you an emotional support animal letter.  We don't do mental health checks ordered by the court.   We don't do mental health testing, but we can refer you to get tested.   Thank you for choosing us for your care.  For informational purposes only. Not to replace the advice of your health care provider. Copyright   2022 [facility]. All rights reserved. Cart[employer] 325255 - Rev 11/24.

## 2025-06-09 NOTE — PROGRESS NOTES
"Telemedicine Visit: The patient's condition can be safely assessed and treated via synchronous audio and visual telemedicine encounter.      Reason for Telemedicine Visit: Patient has requested telehealth visit    Originating Site (Patient Location): Patient's home    Distant Location (provider location):  On-Site    Consent:  The patient/guardian has verbally consented to: the potential risks and benefits of telemedicine (video visit) versus in person care; bill my insurance or make self-payment for services provided; and responsibility for payment of non-covered services.     Mode of Communication:  Video Conference via Wireless Safety    As the provider I attest to compliance with applicable laws and regulations related to telemedicine.         Outpatient Psychiatric Progress Note    Name: Tariq Mccain   : 1970                    Primary Care Provider: Ej De Leon MD   Therapist: None    PHQ-9 scores:      10/11/2024     8:41 AM 2024    10:26 PM 3/14/2025     8:19 AM   PHQ-9 SCORE   PHQ-9 Total Score MyChart 2 (Minimal depression) 5 (Mild depression)    PHQ-9 Total Score 2 5  0       Patient-reported       SHIVAM-7 scores:      2/15/2024     2:13 PM 2024     6:34 PM   SHIVAM-7 SCORE   Total Score 1 (minimal anxiety) 15 (severe anxiety)   Total Score 1 15    15       Patient Identification:  Patient is a 54 year old,   White Not  or  male  who presents for return visit with me.  Patient is currently employed. Patient attended the phone/video session alone. Patient prefers to be called: \"Tariq\".    Interim History:  I last saw Tariq Mccain for outpatient psychiatry return visit on 3/14/2025. During that appointment, we:    Continue alprazolam/Xanax 0.5 mg daily as needed for severe anxiety/panic only. Do not use daily.   Continue fluoxetine/Prozac 10 mg daily for anxiety, mood.  Increase Vyvanse to 30 mg daily as needed for ADHD.  Consider psychotherapy if " needed.    6/9: Patient doing well.  Vyvanse has continued to work out incredibly well to treat ADHD symptoms.  Feels like symptoms adequately controlled on 30 mg dose.  No chest pain or racing heart.  No tics.  Continues to sleep okay.  Often takes the weekend off from stimulant medication.  Will be leaving on a 4-week to work across [place] starting [place] 22.  Patient looking forward to this and should be a good time.  Does report he stopped fluoxetine since things were going well.  Has been struggling a little bit with premature ejaculation.  Has only been a problem since stopping citalopram.  No acute safety concerns.  No SI.  No problematic drug or alcohol use.    Per [facility], Hannah Aponte [facility], during today's team-based visit:  No [facility] available for today    Past Psychiatric Med Trials:  Psych Meds at Intake:  Celexa 20 mg daily - just 20 mg   Xanax 0.5 mg daily prn anxiety (15 tabs last Rxd 8/19/2024, 15 tabs last before that 5/8/2024)     Others: Chantix     Past Psych Meds:  Wellbutrin SR - head felt weird  Naltrexone  Maybe prozac - didn't like it, doesn't really remember the trial   Sertraline - rash    Psychiatric ROS:  See HPI above for all pertinent positives and negatives. Rest of systems negative.     PHQ9 and GAD7 scores were reviewed today if completed.   Medication side effects: Denies any major; no chest pain or sustained racing heart  Current stressors include: Symptoms and see HPI above  Coping mechanisms and supports include: Family, Hobbies, and Friends    Current medications include:   Current Outpatient Medications   Medication Sig Dispense Refill    ALPRAZolam (XANAX) 0.5 MG tablet TAKE 1 TABLET(0.5 MG) BY MOUTH DAILY AS NEEDED FOR ANXIETY 15 tablet 0    aspirin (ASA) 81 MG chewable tablet Take 1 tablet (81 mg) by mouth daily Starting tomorrow.      atorvastatin (LIPITOR) 40 MG tablet Take 1 tablet (40 mg) by mouth daily. 90 tablet 3    clopidogrel (PLAVIX) 75 MG tablet Take 1 tablet  (75 mg) by mouth daily 90 tablet 3    levothyroxine (SYNTHROID/LEVOTHROID) 112 MCG tablet TAKE 1 TABLET(112 MCG) BY MOUTH DAILY 90 tablet 2    lisdexamfetamine (VYVANSE) 30 MG capsule Take 1 capsule (30 mg) by mouth daily. To start 5/30 30 capsule 0    [START ON 6/29/2025] lisdexamfetamine (VYVANSE) 30 MG capsule Take 1 capsule (30 mg) by mouth daily. To start 6/29 30 capsule 0    lisdexamfetamine (VYVANSE) 30 MG capsule Take 1 capsule (30 mg) by mouth daily. 30 capsule 0    multivitamin, therapeutic (THERA-VIT) TABS tablet Take 1 tablet by mouth daily      nitroGLYcerin (NITROSTAT) 0.4 MG sublingual tablet For chest pain place 1 tablet under the tongue every 5 minutes for 3 doses. If symptoms persist 5 minutes after 1st dose call 911. 30 tablet 11    omeprazole (PRILOSEC) 40 MG DR capsule Take 40 mg by mouth daily      sildenafil (VIAGRA) 100 MG tablet Take 1 tablet (100 mg) by mouth daily as needed (erectile dysfunction). 30 tablet 0    vilazodone (VIIBRYD) 10 MG TABS tablet Take 1 tablet (10 mg) by mouth daily. Start with half-tab for first week. 30 tablet 1     No current facility-administered medications for this visit.       The [place] Prescription Monitoring Program has been reviewed and there are no concerns about diversionary activity for controlled substances at this time.   05/06/2025 05/06/2025 2 Alprazolam 0.5 Mg Tablet 15.00 15 Al Bau [id number] [place] ([id number]) 0/0 1.00 LME [place]   05/01/2025 04/25/2025 2 Lisdexamfetamine 30 Mg Capsule 30.00 30 Al Bau [id number] [place] ([id number]) 0/0  [place]   03/31/2025 03/31/2025 2 Lisdexamfetamine 30 Mg Capsule 30.00 30 Gr Ort [id number] [place] ([id number]) 0/0  [place]   02/26/2025 02/26/2025 1 Lisdexamfetamine 20 Mg Capsule 30.00 30 Al Bau [id number] [place] ([id number]) 0/0  [place]       Past Medical/Surgical History:  Past Medical History:   Diagnosis Date    Anxiety state, unspecified     Depressive disorder 2002    Status post coronary angiogram 4/9/2024      has a past medical  history of Anxiety state, unspecified, Depressive disorder (2002), and Status post coronary angiogram (4/9/2024).    He has no past medical history of Arthritis, Cancer (H), Cerebral infarction (H), Congestive heart failure (H), COPD (chronic obstructive pulmonary disease) (H), Diabetes (H), Heart disease, Hypertension, Thyroid disease, or Uncomplicated asthma.    Social History:  Reviewed. No changes to social history except as noted above in HPI.    Vital Signs:   None. This is phone/video visit.     Labs:  Most recent laboratory results reviewed and the pertinent results include:   Recent Labs   Lab Test 11/12/24  0757 04/09/24  1338   CHOL 203* 240*   HDL 50 41   * 191*   TRIG 195* 42     Lab Results   Component Value Date    ALT 25 11/12/2024         Review of Systems:  10 systems (general, cardiovascular, respiratory, eyes, ENT, endocrine, GI, , M/S, neurological) were reviewed. Most pertinent finding(s) is/are:  denies fever, cough, persistent headaches, shortness of breath, chest pain, severe GI symptoms, trouble urinating, severe pain. The remaining systems are all unremarkable.    Mental Status Examination (limited as this is by phone/video):  Appearance: Awake, alert, appears stated age, no acute distress, well-groomed   Attitude:  cooperative, pleasant   Motor: No gross abnormalities observed via video, not formally tested   Oriented to:  person, place, time, and situation  Attention Span and Concentration:  normal  Speech:  clear, coherent, regular rate, rhythm, and volume  Language: intact  Mood: Pretty good  Affect:  appropriate and in normal range and mood congruent  Associations:  no loose associations  Thought Process:  logical, linear and goal oriented  Thought Content:  no evidence of suicidal ideation or homicidal ideation, no evidence of psychotic thought, no auditory hallucinations present and no visual hallucinations present  Recent and Remote Memory:  Intact to interview. Not  formally assessed. No amnesia.  Fund of Knowledge: appropriate  Insight:  good  Judgment:  intact, adequate for safety  Impulse Control:  intact    Suicide Risk Assessment:  Today Tariq Mccain reports no suicidal ideation. Based on all available evidence including the factors cited above, Tariq Mccain does not appear to be at imminent risk for self-harm, does not meet criteria for a 72-hr hold, and therefore remains appropriate for ongoing outpatient level of care.  A thorough assessment of risk factors related to suicide and self-harm have been reviewed and are noted above. The patient convincingly denies suicidality on several occasions. Local community safety resources reviewed for patient to use if needed. There was no deceit detected, and the patient presented in a manner that was believable.     DSM5 Diagnosis:  Attention-Deficit/Hyperactivity Disorder  314.01 (F90.9) Unspecified Attention -Deficit / Hyperactivity Disorder  Major Depressive Disorder, Recurrent Episode, in partial remission  300.02 (F41.1) Generalized Anxiety Disorder  309.9 (F43.9) Unspecified Trauma and Stressor Related Disorder    Medical comorbidities include:   Patient Active Problem List    Diagnosis Date Noted    Coronary artery disease involving native coronary artery of native heart without angina pectoris 05/15/2024     Priority: Medium    Chronic ulcerative pancolitis with complication (H) 09/15/2022     Priority: Medium    Pastor's esophagus with low grade dysplasia 06/28/2016     Priority: Medium     Formatting of this note might be different from the original.  2016-EGD, low grade dysplasia  S/p EGD with RFA 12/13/16      Alcohol use disorder, moderate, in early remission (H) 07/10/2014     Priority: Medium    Major depressive disorder, recurrent episode, in full remission 06/12/2014     Priority: Medium     Formatting of this note is different from the original.  Psychiatry to Primary Care Hand-over  Psychiatrist: Monica  GUNNAR Rodriguez MD   PCP: Ej De Leon MD  Last Visit with Psychiatry:  3/8/2017  Current Medication/s and dose/s: Celexa 20mg  Medications tried and failed/Reason(s) for discontinuation:  Wellbutrin (head felt weird), maybe Prozac (? Didn't like it), Celexa (6 years - no side effects, probably helps).  Sertraline listed as an Allergy.  Recommendations/Plan of care:  Continue Celexa long term.  Recommended Labs Monitoring and Frequency:  n/a  Last PHQ-9:   PHQ-9 12/22/2015 5/12/2015 11/29/2014 11/3/2014 8/21/2014 7/10/2014 6/10/2014   PHQ9 Score - Smartform 3 1 - - 2 8 3   PHQ9 Score - Online Questions - - 1 0 - - -      Kidney stone 02/01/2012     Priority: Medium    Mixed hyperlipidemia 10/20/2009     Priority: Medium    Acquired hypothyroidism 10/20/2009     Priority: Medium    Anxiety disorder 10/20/2009     Priority: Medium       Psychosocial & Contextual Factors: see HPI above    Assessment:  From Intake, 8/30/2024:  Tariq Mccain is a 53-year-old with past psychiatric history including depression, anxiety, trauma, substance abuse in remission who presents today for psychiatric evaluation.  Patient with long history of mental health symptoms dating back to childhood.  Significant history of trauma.  Reports being diagnosed with oppositional defiant disorder as a child.  Struggled significantly in school and started struggling with substance abuse at a young age.  Has not had any substance use struggles for quite some time.  Was last in chemical dependency treatment about 12 years ago for alcohol use.  Uses alcohol currently but denies any problematic use and only uses a couple times a week.  Patient with several features of ADHD as a child and as an adult that are currently interfering with functioning in at least 2 domains.  Patient's son also diagnosed with ADHD and treated with stimulant medication.  Some chronic low grade depression and anxiety symptoms could be related to undertreated/untreated ADHD.   Discussed changing Celexa to a different antidepressant versus trial with ADHD medication.  Patient did not tolerate Wellbutrin XL in the past and so would consider stimulant medication.  Did have coronary angiogram with stent placement in April of this year.  Patient denies any other diagnoses such as arrhythmias, high blood pressure, cardiac hypertrophy, etc.  Discussed I would want patient to discuss possible use of stimulants with cardiology provider prior to prescribing. If cardiology okay with stimulant use in this patient's case, I would send prescription for Vyvanse 20 mg vs Adderall xr 10 mg to  [facility] at [facility] [address].  Could consider clonidine or guanfacine as ADHD medication if stimulants not recommended.  Could also consider changing Celexa to Effexor-XR/venlafaxine ER.  No acute safety concerns.  No SI.  No problematic drug or alcohol use.  Patient encouraged to consider individual psychotherapy for additional support and ongoing development of nonpharmacologic coping skills and strategies.     12/31/2024:  Overall feeling about the same.  Ongoing psychosocial stressors.  Wonders about tapering off citalopram and starting something new versus starting stimulant medication.  Clonidine has not been super effective and when taken twice a day makes him feel too fatigued.  Cardiologist was not in overt support of stimulant medication but was not adamantly against it either.  Cardiac risks have been discussed with the patient and patient would like to move ahead with stimulant medication.  Given his history of stent placement, patient is at slightly higher risk for cardiovascular events with stimulant use.  However, cardiac condition currently under control.  No high blood pressure.  No tachycardia.  Patient agrees to obtain blood pressure cuff to monitor blood pressure.  Patient will monitor for chest pain and racing heart.  Discussed signs and symptoms of elevated blood pressure.   Patient also prefers to wean/taper off Celexa.  No acute safety concerns.  No SI.  No problematic drug or alcohol use.    1/31/2025:  Patient struggling to come off citalopram.  We will bridge with low-dose Prozac.  Patient has waited to start Vyvanse until off citalopram.  No acute safety concerns.  No acute suicidality.  No problematic drug or alcohol use.    3/14/2025:  Patient overall doing relatively well.  Vyvanse working out well and well tolerated at 20 mg daily dose.  Patient could use slightly better control of ADHD symptoms.  We will further optimize Vyvanse to 30 mg daily.  Patient denies chest pain or any sustained racing heart.  Patient would like to stay on fluoxetine 10 mg daily as it has been helpful for mood and anxiety.  No acute safety concerns.  No SI.  No problematic drug or alcohol use.    6/9/2025:  Overall doing quite well.  Had stopped taking fluoxetine since was doing well.  Still doing well mood and anxiety wise-symptoms manageable, but has been experiencing premature ejaculation.  We discussed starting fluoxetine and taking as needed for premature ejaculation.  Discussed taking on as-needed basis to find a balance between helping with premature ejaculation versus problematically delaying ejaculation.  Vyvanse 30 mg daily adequate to treat symptoms and working well.  No negative side effects.  No acute safety concerns.  No acute suicidality.  No problematic drug or alcohol use.  Patient will be seen back in 3 months and if things remain stable psychiatric care will return back to primary care provider for ongoing management.    Medication side effects and alternatives were reviewed. Health promotion activities recommended and reviewed today. All questions addressed. Education and counseling completed regarding risks and benefits of medications and psychotherapy options. Recommend therapy for additional support.     Treatment Plan:  Continue alprazolam/Xanax 0.5 mg daily as needed for  severe anxiety/panic only. Do not use daily.   Continue fluoxetine/Prozac 10 mg daily for anxiety, mood.  Discussed okay to take on as-needed basis if only using for premature ejaculation.  Continue Vyvanse 30 mg daily as needed for ADHD.  Consider psychotherapy if needed.  Continue all other cares per primary care provider.   Continue all other medications as reviewed per electronic medical record today.   Safety plan reviewed. To the Emergency Department as needed or call after hours crisis line at [phone] or [phone]. [place] Crisis Text Line. Text [place] to 926529 or Suicide LifeLine Chat: suicidepreventionBallparcline.org/chat  Schedule an appointment with me in 3 months or sooner as needed. Call [facility] at [phone] to schedule.  Follow up with primary care provider as planned or for acute medical concerns.  Call the psychiatric nurse line with medication questions or concerns at [phone].  [employer] may be used to communicate with your provider, but this is not intended to be used for emergencies.    Patient Education:  Have previously discussed risks of stimulant medication including, but not limited to, decreased appetite, risk of tics (and that they may be lasting), trouble sleeping, cardiac risks such as increased heart rate and blood pressure, and rare risk of sudden cardiac death.  Also risk of addiction/tolerance/dependence.     Risks of benzodiazepine (Ativan, Xanax, Klonopin, Valium, etc) use including, but not limited to, sedation, tolerance, risk for addiction/dependence. Do not drink alcohol while taking benzodiazepines due to risk of trouble breathing and potential death. Do not drive or operate heavy machinery until it is known how the drug affects you. Discuss with physician or pharmacist before ever taking a benzodiazepine with a narcotic/opioid pain medication.      Good ADHD resources:  Books-Mastering Adult ADHD, Driven to Distraction, Take Charge of Adult  ADHD  Website-www.[employer]     ADHD medication expectations:   https://www.[employer].com.[employer]slideshows/qnn-ahat-pfpf-medication-work/     What to Expect and What NOT to Expect from Stimulant Medication  by Ml Snyder, PhD     Many clients taking stimulant medication aren t sure what to expect from it. They may have unrealistic expectations of their medication and decide it s not working. Or they may have become used to the benefits of stimulant medication and think it s no longer working.     Here s a list of things to expect when taking stimulant medications. Of course, everyone has a different reaction and a different level of sensitivity to medication, so some seem to benefit much more clearly than others.     A good response to stimulant medication typically results in:     - Improved attention span - being able to read longer while staying focused; being able to listen longer while staying focused.     - Reduced distractability - being able to remain focused when some distractions occur around you.     - Greater ease in getting back on task after an interruption.     - Better working memory - i.e., being able to remember the three things you went downstairs to get.     - Easier to start tasks and complete them.     - Reduced feeling of stress and overwhelm.     - Decreased irritability and over-reactivity     - Reduced feelings of restlessness or hyperactivity     - Reduced impulsiveness - less likely to interrupt, to make decisions with little consideration for costs or consequences     However, as Corbin Bae MD, said so memorably,  Pills don t build skills.  You shouldn t expect stimulant medication to help you organize your files, improve your study skills, write your paper, prioritize your tasks, reduce your clutter, or problem-solve better. But it will put your brain in a state where these skills can be more easily learned.     Often, the best pairing is stimulant medication in combination  "with a therapist, organizer or  that is helping you build the skills you need to succeed now that you re able to focus and concentrate.     Get Out of Your Mind & Into Your Life   By Geremias Singh     The Happiness Trap: How to Stop Struggling and Start Living  By Tyrell Jack     The Reality Slap: Finding Peace & Fulfillment When Life Hurts  By Tyrell Jack     Things Might Go Terribly, Horribly Wrong: A Guide to Life Liberated from Anxiety  By Juana Garza, PhD     Stress Less, Live More: How Acceptance and Commitment Therapy Can Help You Live a Busy Yet Balanced Life  By Sukumar Cole     Finding Life Beyond Trauma: Using Acceptance and Commitment Therapy to Heal From Post-Traumatic Stress and Trauma-Related Problems  By Vanessa Gallardo and Jessica Stroud     Other ACT Skills References     Tyrell Jack on YouTube - Demonstrates several different skills to deal with difficult thoughts and feelings     Book:  \"A Liberated Mind: How to Pivot Toward What Matters\" by Geremias Singh     Psychological flexibility: How love turns pain into purpose  Tedx Talk by Dr. Singh discussing his struggle with anxiety and panic disorder which motivated him to develop ACT therapy (Acceptance and Commitment Therapy)     You Are Not Your Thoughts     Care team has reviewed attendance agreement with patient. Patient advised that two failed appointments within 6 months may lead to termination of current episode of care.      Community Resources:    National Suicide Prevention Lifeline: 929.512.9236 (TTY: 486.187.5536). Call anytime for help.  (www.suicidepreventionlifeline.org)  National [place] on Mental Illness (www.geo.org): 518.325.1554 or 413-702-9489.   Mental Health Association (www.mentalhealth.org): 364.246.7297 or 336-713-6310.  [place] Crisis Text Line: Text [place] to 796589  Suicide LifeLine Chat: suicideBRANDiD - [employer]. Like [employer] Man..org/chat    Administrative Billing:   Phone Call/Video Duration: 16 Minutes  Start: " 8:32a  Stop: 8:48a    The longitudinal plan of care for the diagnosis(es)/condition(s) as documented were addressed during this visit. Due to the added complexity in care, I will continue to support Tariq in the subsequent management and with ongoing continuity of care.    Episode of Care #: 5    Patient Status:  Patient will continue to be seen for ongoing consultation and stabilization.    Signed:   Mehnaz Hull DO  [facility]    Disclaimer: This note consists of symbols derived from keyboarding, dictation and/or voice recognition software. As a result, there may be errors in the script that have gone undetected. Please consider this when interpreting information found in this chart.

## 2025-06-09 NOTE — NURSING NOTE
Is the patient currently in the state of [place]? YES    Current patient location: [address] [place] [id number]    Visit mode:Video    If the visit is dropped, the patient can be reconnected by: VIDEO VISIT:  Send e-mail to at [email or web address]    Will anyone else be joining the visit? No  (If patient encounters technical issues they should call [phone])    Are changes needed to the allergy or medication list? No    Are refills needed on medications prescribed by this physician? Discuss with Provider    Rooming Documentation: Questionnaire(s) completed.    Reason for visit: RECHECK     VALERIY Beard

## 2025-07-10 ENCOUNTER — OFFICE VISIT (OUTPATIENT)
Dept: INTERNAL MEDICINE | Facility: CLINIC | Age: 55
End: 2025-07-10
Payer: COMMERCIAL

## 2025-07-10 VITALS
DIASTOLIC BLOOD PRESSURE: 72 MMHG | SYSTOLIC BLOOD PRESSURE: 120 MMHG | HEIGHT: 69 IN | RESPIRATION RATE: 13 BRPM | TEMPERATURE: 98 F | BODY MASS INDEX: 28.58 KG/M2 | OXYGEN SATURATION: 99 % | HEART RATE: 74 BPM | WEIGHT: 193 LBS

## 2025-07-10 DIAGNOSIS — E03.9 ACQUIRED HYPOTHYROIDISM: ICD-10-CM

## 2025-07-10 DIAGNOSIS — M79.672 LEFT FOOT PAIN: Primary | ICD-10-CM

## 2025-07-10 DIAGNOSIS — F10.21 ALCOHOL USE DISORDER, MODERATE, IN EARLY REMISSION (H): ICD-10-CM

## 2025-07-10 PROBLEM — K51.019: Status: RESOLVED | Noted: 2022-09-15 | Resolved: 2025-07-10

## 2025-07-10 LAB
ALBUMIN SERPL BCG-MCNC: 4.4 G/DL (ref 3.5–5.2)
ALP SERPL-CCNC: 69 U/L (ref 40–150)
ALT SERPL W P-5'-P-CCNC: 26 U/L (ref 0–70)
ANION GAP SERPL CALCULATED.3IONS-SCNC: 10 MMOL/L (ref 7–15)
AST SERPL W P-5'-P-CCNC: 25 U/L (ref 0–45)
BILIRUB SERPL-MCNC: 0.5 MG/DL
BUN SERPL-MCNC: 21.6 MG/DL (ref 6–20)
CALCIUM SERPL-MCNC: 9.8 MG/DL (ref 8.8–10.4)
CHLORIDE SERPL-SCNC: 104 MMOL/L (ref 98–107)
CREAT SERPL-MCNC: 0.94 MG/DL (ref 0.67–1.17)
EGFRCR SERPLBLD CKD-EPI 2021: >90 ML/MIN/1.73M2
GLUCOSE SERPL-MCNC: 99 MG/DL (ref 70–99)
HCO3 SERPL-SCNC: 24 MMOL/L (ref 22–29)
POTASSIUM SERPL-SCNC: 5.3 MMOL/L (ref 3.4–5.3)
PROT SERPL-MCNC: 6.9 G/DL (ref 6.4–8.3)
SODIUM SERPL-SCNC: 138 MMOL/L (ref 135–145)
TSH SERPL DL<=0.005 MIU/L-ACNC: 1.52 UIU/ML (ref 0.3–4.2)
URATE SERPL-MCNC: 5.7 MG/DL (ref 3.4–7)

## 2025-07-10 RX ORDER — COLCHICINE 0.6 MG/1
TABLET ORAL
Qty: 30 TABLET | Refills: 1 | Status: SHIPPED | OUTPATIENT
Start: 2025-07-10 | End: 2025-07-10

## 2025-07-10 RX ORDER — COLCHICINE 0.6 MG/1
TABLET ORAL
Qty: 30 TABLET | Refills: 1 | Status: SHIPPED | OUTPATIENT
Start: 2025-07-10

## 2025-07-10 ASSESSMENT — PATIENT HEALTH QUESTIONNAIRE - PHQ9
SUM OF ALL RESPONSES TO PHQ QUESTIONS 1-9: 1
10. IF YOU CHECKED OFF ANY PROBLEMS, HOW DIFFICULT HAVE THESE PROBLEMS MADE IT FOR YOU TO DO YOUR WORK, TAKE CARE OF THINGS AT HOME, OR GET ALONG WITH OTHER PEOPLE: NOT DIFFICULT AT ALL
SUM OF ALL RESPONSES TO PHQ QUESTIONS 1-9: 1

## 2025-07-10 NOTE — PROGRESS NOTES
"  Assessment & Plan     (M79.672) Left foot pain  (primary encounter diagnosis)  Comment: suspect gout  Plan: Uric acid, colchicine (COLCRYS) 0.6 MG tablet,         DISCONTINUED: colchicine (COLCRYS) 0.6 MG         tablet        Has had one episode at least in the past 5 years. States he only occasionally drinks wine now and does not remember having much animal DNA-related foods in any excess amount recently. Will use colchicine as it was effective that one time and steroids seem to have a fairly profound negative effect on his mood. Uric acid and BMP today. He is not interested in maintenance medication unless this becomes a regular occurrence.     (E03.9) Acquired hypothyroidism  Comment: stable clinically  Plan: TSH WITH FREE T4 REFLEX        recheck    (F10.21) Alcohol use disorder, moderate, in early remission (H)  Comment: occasional wine per patient. No known liver disease or other end organ damage.  Plan: Comprehensive metabolic panel (BMP + Alb, Alk         Phos, ALT, AST, Total. Bili, TP)        Labs.    Sees me again next week for physical   HM items to be addressed at that time.    The longitudinal plan of care for the diagnosis(es)/condition(s) as documented were addressed during this visit. Due to the added complexity in care, I will continue to support Tariq in the subsequent management and with ongoing continuity of care.     BMI  Estimated body mass index is 28.5 kg/m  as calculated from the following:    Height as of this encounter: 1.753 m (5' 9\").    Weight as of this encounter: 87.5 kg (193 lb).           Subjective   Tariq is a 54 year old, presenting for the following health issues:  Foot Pain (Left foot pains along with swelling & redness )      7/10/2025    11:10 AM   Additional Questions   Roomed by Chastity MCCAULEY   Accompanied by alone         7/10/2025    11:10 AM   Patient Reported Additional Medications   Patient reports taking the following new medications none     History of Present " "Illness       Reason for visit:  Foot injury  Symptom onset:  1-2 weeks ago   He is taking medications regularly.                      Objective    /72 (BP Location: Right arm, Patient Position: Sitting, Cuff Size: Adult Regular)   Pulse 74   Temp 98  F (36.7  C) (Tympanic)   Resp 13   Ht 1.753 m (5' 9\")   Wt 87.5 kg (193 lb)   SpO2 99%   BMI 28.50 kg/m    Body mass index is 28.5 kg/m .  Physical Exam   Left foot with mild edema, erythema, diffuse and tenderness. No significant edema beyond ankle.            Signed Electronically by: Ej De Leon MD    "

## 2025-07-11 ENCOUNTER — RESULTS FOLLOW-UP (OUTPATIENT)
Dept: INTERNAL MEDICINE | Facility: CLINIC | Age: 55
End: 2025-07-11
Payer: COMMERCIAL

## 2025-07-13 ENCOUNTER — MYC MEDICAL ADVICE (OUTPATIENT)
Dept: INTERNAL MEDICINE | Facility: CLINIC | Age: 55
End: 2025-07-13
Payer: COMMERCIAL

## 2025-07-13 DIAGNOSIS — M79.672 LEFT FOOT PAIN: Primary | ICD-10-CM

## 2025-07-14 RX ORDER — PREDNISONE 10 MG/1
TABLET ORAL
Qty: 11 TABLET | Refills: 0 | Status: SHIPPED | OUTPATIENT
Start: 2025-07-14 | End: 2025-07-23

## 2025-07-14 SDOH — HEALTH STABILITY: PHYSICAL HEALTH: ON AVERAGE, HOW MANY MINUTES DO YOU ENGAGE IN EXERCISE AT THIS LEVEL?: 20 MIN

## 2025-07-14 SDOH — HEALTH STABILITY: PHYSICAL HEALTH: ON AVERAGE, HOW MANY DAYS PER WEEK DO YOU ENGAGE IN MODERATE TO STRENUOUS EXERCISE (LIKE A BRISK WALK)?: 2 DAYS

## 2025-07-14 ASSESSMENT — SOCIAL DETERMINANTS OF HEALTH (SDOH): HOW OFTEN DO YOU GET TOGETHER WITH FRIENDS OR RELATIVES?: THREE TIMES A WEEK

## 2025-07-16 ENCOUNTER — OFFICE VISIT (OUTPATIENT)
Dept: INTERNAL MEDICINE | Facility: CLINIC | Age: 55
End: 2025-07-16
Payer: COMMERCIAL

## 2025-07-16 VITALS
OXYGEN SATURATION: 98 % | HEART RATE: 81 BPM | RESPIRATION RATE: 14 BRPM | DIASTOLIC BLOOD PRESSURE: 79 MMHG | BODY MASS INDEX: 28.29 KG/M2 | SYSTOLIC BLOOD PRESSURE: 124 MMHG | WEIGHT: 191 LBS | HEIGHT: 69 IN | TEMPERATURE: 97.9 F

## 2025-07-16 DIAGNOSIS — Z00.00 ROUTINE GENERAL MEDICAL EXAMINATION AT A HEALTH CARE FACILITY: Primary | ICD-10-CM

## 2025-07-16 DIAGNOSIS — F33.42 MAJOR DEPRESSIVE DISORDER, RECURRENT EPISODE, IN FULL REMISSION: ICD-10-CM

## 2025-07-16 DIAGNOSIS — K22.710 BARRETT'S ESOPHAGUS WITH LOW GRADE DYSPLASIA: ICD-10-CM

## 2025-07-16 DIAGNOSIS — E78.2 MIXED HYPERLIPIDEMIA: ICD-10-CM

## 2025-07-16 DIAGNOSIS — I25.10 CORONARY ARTERY DISEASE INVOLVING NATIVE CORONARY ARTERY OF NATIVE HEART WITHOUT ANGINA PECTORIS: ICD-10-CM

## 2025-07-16 DIAGNOSIS — E03.9 ACQUIRED HYPOTHYROIDISM: ICD-10-CM

## 2025-07-16 DIAGNOSIS — F90.0 ATTENTION DEFICIT HYPERACTIVITY DISORDER (ADHD), PREDOMINANTLY INATTENTIVE TYPE: ICD-10-CM

## 2025-07-16 DIAGNOSIS — F10.21 ALCOHOL USE DISORDER, MODERATE, IN EARLY REMISSION (H): ICD-10-CM

## 2025-07-16 PROCEDURE — 90750 HZV VACC RECOMBINANT IM: CPT | Performed by: INTERNAL MEDICINE

## 2025-07-16 PROCEDURE — G2211 COMPLEX E/M VISIT ADD ON: HCPCS | Performed by: INTERNAL MEDICINE

## 2025-07-16 PROCEDURE — 90472 IMMUNIZATION ADMIN EACH ADD: CPT | Performed by: INTERNAL MEDICINE

## 2025-07-16 PROCEDURE — 90471 IMMUNIZATION ADMIN: CPT | Performed by: INTERNAL MEDICINE

## 2025-07-16 PROCEDURE — 90715 TDAP VACCINE 7 YRS/> IM: CPT | Performed by: INTERNAL MEDICINE

## 2025-07-16 PROCEDURE — 3074F SYST BP LT 130 MM HG: CPT | Performed by: INTERNAL MEDICINE

## 2025-07-16 PROCEDURE — 3078F DIAST BP <80 MM HG: CPT | Performed by: INTERNAL MEDICINE

## 2025-07-16 PROCEDURE — 99214 OFFICE O/P EST MOD 30 MIN: CPT | Mod: 25 | Performed by: INTERNAL MEDICINE

## 2025-07-16 PROCEDURE — 99396 PREV VISIT EST AGE 40-64: CPT | Mod: 25 | Performed by: INTERNAL MEDICINE

## 2025-07-16 RX ORDER — OMEPRAZOLE 40 MG/1
40 CAPSULE, DELAYED RELEASE ORAL DAILY
Qty: 90 CAPSULE | Refills: 3 | Status: SHIPPED | OUTPATIENT
Start: 2025-07-16

## 2025-07-16 NOTE — PROGRESS NOTES
[facility]  Ej De Leon MD, Internal Medicine  Jul 16, 2025      Assessment & Plan     (Z00.00) Routine general medical examination at a health care facility  (primary encounter diagnosis)  Comment: stable overall  Plan: doing fairly well with his health other than a recent gout flare.    (I25.10) Coronary artery disease involving native coronary artery of native heart without angina pectoris  Comment: 2024 CAD s/p PCI to ramus intermedius.   Plan: asx. On appropriate medication. Sees cardiology yearly.    (F33.42) Major depressive disorder, recurrent episode, in full remission  Comment: followed by psychiatry. Stable overall. Discovery and treatment for ADD in the recent past has helped quite a bit.  Plan: likely will return to primary care in the near future if stable.    (E78.2) Mixed hyperlipidemia  Comment: on statin  Plan: Will plan to continue on previous medication without changes     (K22.710) Pastor's esophagus with low grade dysplasia  Comment: hx of  Plan: omeprazole (PRILOSEC) 40 MG DR capsule        Followed by GI    (F10.21) Alcohol use disorder, moderate, in early remission (H)  Comment: still some etoh intake though appears fairly minimal.  Plan: will watch for now.    (E03.9) Acquired hypothyroidism  Comment: on replacement  Plan: recent labs fine.    (F90.0) Attention deficit hyperactivity disorder (ADHD), predominantly inattentive type  Comment: recent diagnosis, on vyvanse.  Plan: stable on 30mg.    Gout--recent flare. Left foot. Normal uric acid at time. Improvement noted on colchicine, to try prednisone due to incomplete response.    Tdap and shingrix today. Will get second shingrix and prevnar in the coming few months.    The longitudinal plan of care for the diagnosis(es)/condition(s) as documented were addressed during this visit. Due to the added complexity in care, I will continue to support Tariq in the subsequent management and with  "ongoing continuity of care.     See me again in a year, earlier if needed.        BMI  Estimated body mass index is 28.21 kg/m  as calculated from the following:    Height as of this encounter: 1.753 m (5' 9\").    Weight as of this encounter: 86.6 kg (191 lb).       Counseling  Appropriate preventive services were addressed with this patient via screening, questionnaire, or discussion as appropriate for fall prevention, nutrition, physical activity, Tobacco-use cessation, social engagement, weight loss and cognition.  Checklist reviewing preventive services available has been given to the patient.  Reviewed patient's diet, addressing concerns and/or questions.   He is at risk for lack of exercise and has been provided with information to increase physical activity for the benefit of his well-being.   He is at risk for psychosocial distress and has been provided with information to reduce risk.           Marilyn Potter is a 54 year old, presenting for the following:  Annual Visit (General health check - fasting today )        7/16/2025     7:53 AM   Additional Questions   Roomed by Chastity MCCAULEY   Accompanied by alone         7/16/2025     7:53 AM   Patient Reported Additional Medications   Patient reports taking the following new medications none          HPI  Pt is here for a physical                   7/14/2025   General Health   How would you rate your overall physical health? Good   Feel stress (tense, anxious, or unable to sleep) To some extent   (!) STRESS CONCERN      7/14/2025   Nutrition   Three or more servings of calcium each day? Yes   Diet: Regular (no restrictions)   How many servings of fruit and vegetables per day? (!) 2-3   How many sweetened beverages each day? 0-1         7/14/2025   Exercise   Days per week of moderate/strenous exercise 2 days   Average minutes spent exercising at this level 20 min   (!) EXERCISE CONCERN      7/14/2025   Social Factors   Frequency of gathering with friends or " "relatives Three times a week   Worry food won't last until get money to buy more No   Food not last or not have enough money for food? No   Do you have housing? (Housing is defined as stable permanent housing and does not include staying outside in a car, in a tent, in an abandoned building, in an overnight shelter, or couch-surfing.) Yes   Are you worried about losing your housing? No   Lack of transportation? No   Unable to get utilities (heat,electricity)? No         2025   Fall Risk   Fallen 2 or more times in the past year? No   Trouble with walking or balance? No          2025   Dental   Dentist two times every year? Yes         Today's PHQ-9 Score:       7/10/2025    10:59 AM   PHQ-9 SCORE   PHQ-9 Total Score MyChart 1 (Minimal depression)   PHQ-9 Total Score 1        Patient-reported           2025   Substance Use   Alcohol more than 3/day or more than 7/wk No   Do you use any other substances recreationally? No     Social History     Tobacco Use    Smoking status: Former     Current packs/day: 0.00     Types: Cigarettes, Other     Quit date: 2001     Years since quittin.5    Smokeless tobacco: Never    Tobacco comments:     Still using E-cig   Vaping Use    Vaping status: Some Days    Substances: Nicotine    Devices: Refillable tank   Substance Use Topics    Alcohol use: Not Currently     Comment: Very Occasional    Drug use: Not Currently     Comment: Have not used drugs in many years, but did when I was young.             2025   One time HIV Screening   Previous HIV test? Yes         2025   STI Screening   New sexual partner(s) since last STI/HIV test? No   Last PSA: No results found for: \"PSA\"  ASCVD Risk   The 10-year ASCVD risk score (Cuauhtemoc BELLA, et al., 2019) is: 5.9%    Values used to calculate the score:      Age: 54 years      Sex: Male      Is Non- : No      Diabetic: No      Tobacco smoker: No      Systolic Blood Pressure: 124 " "mmHg      Is BP treated: No      HDL Cholesterol: 50 mg/dL      Total Cholesterol: 203 mg/dL           Reviewed and updated as needed this visit by Provider                             Objective    Exam  /79 (BP Location: Left arm, Patient Position: Sitting, Cuff Size: Adult Regular)   Pulse 81   Temp 97.9  F (36.6  C) (Tympanic)   Resp 14   Ht 1.753 m (5' 9\")   Wt 86.6 kg (191 lb)   SpO2 98%   BMI 28.21 kg/m     Estimated body mass index is 28.21 kg/m  as calculated from the following:    Height as of this encounter: 1.753 m (5' 9\").    Weight as of this encounter: 86.6 kg (191 lb).    Physical Exam  GENERAL: alert and no distress  EYES: Eyes grossly normal to inspection, PERRL and conjunctivae and sclerae normal  HENT: ear canals and TM's normal, nose and mouth without ulcers or lesions  NECK: no adenopathy, no asymmetry, masses, or scars  RESP: lungs clear to auscultation - no rales, rhonchi or wheezes  CV: regular rate and rhythm, normal S1 S2, no S3 or S4, no murmur, click or rub, no peripheral edema  ABDOMEN: soft, nontender, no hepatosplenomegaly, no masses and bowel sounds normal  NEURO: Normal strength and tone, mentation intact and speech normal  PSYCH: mentation appears normal, affect normal/bright        Signed Electronically by: Ej De Leon MD    "

## 2025-07-16 NOTE — PROGRESS NOTES
Prior to immunization administration, verified patients identity using patient s name and date of birth. Please see Immunization Activity for additional information.     Screening Questionnaire for Adult Immunization    Are you sick today?   No   Do you have allergies to medications, food, a vaccine component or latex?   No   Have you ever had a serious reaction after receiving a vaccination?   No   Do you have a long-term health problem with heart, lung, kidney, or metabolic disease (e.g., diabetes), asthma, a blood disorder, no spleen, complement component deficiency, a cochlear implant, or a spinal fluid leak?  Are you on long-term aspirin therapy?   No   Do you have cancer, leukemia, HIV/AIDS, or any other immune system problem?   No   Do you have a parent, brother, or sister with an immune system problem?   No   In the past 3 months, have you taken medications that affect  your immune system, such as prednisone, other steroids, or anticancer drugs; drugs for the treatment of rheumatoid arthritis, Crohn s disease, or psoriasis; or have you had radiation treatments?   No   Have you had a seizure, or a brain or other nervous system problem?   No   During the past year, have you received a transfusion of blood or blood    products, or been given immune (gamma) globulin or antiviral drug?   No   For women: Are you pregnant or is there a chance you could become       pregnant during the next month?   No   Have you received any vaccinations in the past 4 weeks?   No     Immunization questionnaire answers were all negative.      Patient instructed to remain in clinic for 15 minutes afterwards, and to report any adverse reactions.     Screening performed by Aric Stephens RN on 7/16/2025 at 9:13 AM.

## 2025-07-16 NOTE — PATIENT INSTRUCTIONS
Patient Education   Preventive Care Advice   This is general advice given by our system to help you stay healthy. However, your care team may have specific advice just for you. Please talk to your care team about your preventive care needs.  Nutrition  Eat 5 or more servings of fruits and vegetables each day.  Try wheat bread, brown rice and whole grain pasta (instead of white bread, rice, and pasta).  Get enough calcium and vitamin D. Check the label on foods and aim for 100% of the RDA (recommended daily allowance).  Lifestyle  Exercise at least 150 minutes each week  (30 minutes a day, 5 days a week).  Do muscle strengthening activities 2 days a week. These help control your weight and prevent disease.  No smoking.  Wear sunscreen to prevent skin cancer.  Have a dental exam and cleaning every 6 months.  Yearly exams  See your health care team every year to talk about:  Any changes in your health.  Any medicines your care team has prescribed.  Preventive care, family planning, and ways to prevent chronic diseases.  Shots (vaccines)   HPV shots (up to age 26), if you've never had them before.  Hepatitis B shots (up to age 59), if you've never had them before.  COVID-19 shot: Get this shot when it's due.  Flu shot: Get a flu shot every year.  Tetanus shot: Get a tetanus shot every 10 years.  Pneumococcal, hepatitis A, and RSV shots: Ask your care team if you need these based on your risk.  Shingles shot (for age 50 and up)  General health tests  Diabetes screening:  Starting at age 35, Get screened for diabetes at least every 3 years.  If you are younger than age 35, ask your care team if you should be screened for diabetes.  Cholesterol test: At age 39, start having a cholesterol test every 5 years, or more often if advised.  Bone density scan (DEXA): At age 50, ask your care team if you should have this scan for osteoporosis (brittle bones).  Hepatitis C: Get tested at least once in your life.  STIs (sexually  transmitted infections)  Before age 24: Ask your care team if you should be screened for STIs.  After age 24: Get screened for STIs if you're at risk. You are at risk for STIs (including HIV) if:  You are sexually active with more than one person.  You don't use condoms every time.  You or a partner was diagnosed with a sexually transmitted infection.  If you are at risk for HIV, ask about PrEP medicine to prevent HIV.  Get tested for HIV at least once in your life, whether you are at risk for HIV or not.  Cancer screening tests  Cervical cancer screening: If you have a cervix, begin getting regular cervical cancer screening tests starting at age 21.  Breast cancer scan (mammogram): If you've ever had breasts, begin having regular mammograms starting at age 40. This is a scan to check for breast cancer.  Colon cancer screening: It is important to start screening for colon cancer at age 45.  Have a colonoscopy test every 10 years (or more often if you're at risk) Or, ask your provider about stool tests like a FIT test every year or Cologuard test every 3 years.  To learn more about your testing options, visit:   .  For help making a decision, visit:   https://bit.ly/tx06038.  Prostate cancer screening test: If you have a prostate, ask your care team if a prostate cancer screening test (PSA) at age 55 is right for you.  Lung cancer screening: If you are a current or former smoker ages 50 to 80, ask your care team if ongoing lung cancer screenings are right for you.  For informational purposes only. Not to replace the advice of your health care provider. Copyright   2023 [facility]. All rights reserved. Clinically reviewed by the [facility]. mySociety 729762 - REV 01/24.  Learning About Stress  What is stress?     Stress is your body's response to a hard situation. Your body can have a physical, emotional, or mental response. Stress is a fact of life for most people, and it  affects everyone differently. What causes stress for you may not be stressful for someone else.  A lot of things can cause stress. You may feel stress when you go on a job interview, take a test, or run a race. This kind of short-term stress is normal and even useful. It can help you if you need to work hard or react quickly. For example, stress can help you finish an important job on time.  Long-term stress is caused by ongoing stressful situations or events. Examples of long-term stress include long-term health problems, ongoing problems at work, or conflicts in your family. Long-term stress can harm your health.  How does stress affect your health?  When you are stressed, your body responds as though you are in danger. It makes hormones that speed up your heart, make you breathe faster, and give you a burst of energy. This is called the fight-or-flight stress response. If the stress is over quickly, your body goes back to normal and no harm is done.  But if stress happens too often or lasts too long, it can have bad effects. Long-term stress can make you more likely to get sick, and it can make symptoms of some diseases worse. If you tense up when you are stressed, you may develop neck, shoulder, or low back pain. Stress is linked to high blood pressure and heart disease.  Stress also harms your emotional health. It can make you meng, tense, or depressed. Your relationships may suffer, and you may not do well at work or school.  What can you do to manage stress?  You can try these things to help manage stress:   Do something active. Exercise or activity can help reduce stress. Walking is a great way to get started. Even everyday activities such as housecleaning or yard work can help.  Try yoga or natalya chi. These techniques combine exercise and meditation. You may need some training at first to learn them.  Do something you enjoy. For example, listen to music or go to a movie. Practice your hobby or do volunteer  "work.  Meditate. This can help you relax, because you are not worrying about what happened before or what may happen in the future.  Do guided imagery. Imagine yourself in any setting that helps you feel calm. You can use online videos, books, or a teacher to guide you.  Do breathing exercises. For example:  From a standing position, bend forward from the waist with your knees slightly bent. Let your arms dangle close to the floor.  Breathe in slowly and deeply as you return to a standing position. Roll up slowly and lift your head last.  Hold your breath for just a few seconds in the standing position.  Breathe out slowly and bend forward from the waist.  Let your feelings out. Talk, laugh, cry, and express anger when you need to. Talking with supportive friends or family, a counselor, or a george leader about your feelings is a healthy way to relieve stress. Avoid discussing your feelings with people who make you feel worse.  Write. It may help to write about things that are bothering you. This helps you find out how much stress you feel and what is causing it. When you know this, you can find better ways to cope.  What can you do to prevent stress?  You might try some of these things to help prevent stress:  Manage your time. This helps you find time to do the things you want and need to do.  Get enough sleep. Your body recovers from the stresses of the day while you are sleeping.  Get support. Your family, friends, and community can make a difference in how you experience stress.  Limit your news feed. Avoid or limit time on social media or news that may make you feel stressed.  Do something active. Exercise or activity can help reduce stress. Walking is a great way to get started.  Where can you learn more?  Go to https://www.Bril[employer].net/patiented  Enter N032 in the search box to learn more about \"Learning About Stress.\"  Current as of: October 24, 2024  Content Version: 14.5 2024-2025 [employer].   Care instructions adapted under license by your healthcare professional. If you have questions about a medical condition or this instruction, always ask your healthcare professional. [employer] disclaims any warranty or liability for your use of this information.

## 2025-07-17 ENCOUNTER — MYC MEDICAL ADVICE (OUTPATIENT)
Dept: INTERNAL MEDICINE | Facility: CLINIC | Age: 55
End: 2025-07-17
Payer: COMMERCIAL

## 2025-09-02 ENCOUNTER — MYC REFILL (OUTPATIENT)
Dept: PSYCHIATRY | Facility: CLINIC | Age: 55
End: 2025-09-02
Payer: COMMERCIAL

## 2025-09-02 ENCOUNTER — MYC REFILL (OUTPATIENT)
Dept: CARDIOLOGY | Facility: CLINIC | Age: 55
End: 2025-09-02
Payer: COMMERCIAL

## 2025-09-02 DIAGNOSIS — F90.9 ATTENTION DEFICIT HYPERACTIVITY DISORDER (ADHD), UNSPECIFIED ADHD TYPE: ICD-10-CM

## 2025-09-02 DIAGNOSIS — E78.2 MIXED HYPERLIPIDEMIA: ICD-10-CM

## 2025-09-02 RX ORDER — LISDEXAMFETAMINE DIMESYLATE 30 MG/1
30 CAPSULE ORAL DAILY
Qty: 30 CAPSULE | Refills: 0 | Status: SHIPPED | OUTPATIENT
Start: 2025-09-02

## 2025-09-02 RX ORDER — ATORVASTATIN CALCIUM 40 MG/1
40 TABLET, FILM COATED ORAL DAILY
Qty: 90 TABLET | Refills: 1 | Status: SHIPPED | OUTPATIENT
Start: 2025-09-02

## (undated) DEVICE — INTRO GLIDESHEATH SLENDER 6FR 10X45CM 60-1060

## (undated) DEVICE — CATH GUIDING  6F MACH 1 GUIDING CLS3.5

## (undated) DEVICE — CATH ANGIO 100CM 5FR INFNT JL3.5 CRV COR FEM 534518T

## (undated) DEVICE — TOTE ANGIO CORP PC15AT SAN32CC83O

## (undated) DEVICE — DEFIB PRO-PADZ LVP LQD GEL ADULT 8900-2105-01

## (undated) DEVICE — KIT HAND CONTROL ANGIOTOUCH ACIST 65CM AT-P65

## (undated) DEVICE — CATH BALLOON EMERGE 2.5X12MM H7493918912250

## (undated) DEVICE — MANIFOLD KIT ANGIO AUTOMATED 014613

## (undated) DEVICE — CATH ANGIO INFINITI JR4 4FRX100CM 538421

## (undated) DEVICE — RAD G/W INQWIRE .035X260CM J-TIP EXCHANGE IQ35F260J1O5RS

## (undated) DEVICE — GUIDEWIRE VASC 0.014INX180CM RUNTHROUGH 25-1011

## (undated) DEVICE — RAD INFLATOR BASIC COMPAK  IN4130

## (undated) DEVICE — VALVE HEMOSTASIS .096" COPILOT MECH 1003331

## (undated) DEVICE — KIT LG BORE TOUHY ACCESS PLUS MAP152

## (undated) DEVICE — SLEEVE TR BAND RADIAL COMPRESSION DEVICE 29CM XX-RF06L

## (undated) DEVICE — CATH GUIDING BLUE YELLOW PTFE XB3.5 6FRX100CM 67005400

## (undated) RX ORDER — HEPARIN SODIUM 200 [USP'U]/100ML
INJECTION, SOLUTION INTRAVENOUS
Status: DISPENSED
Start: 2024-04-09

## (undated) RX ORDER — HEPARIN SODIUM 1000 [USP'U]/ML
INJECTION, SOLUTION INTRAVENOUS; SUBCUTANEOUS
Status: DISPENSED
Start: 2024-04-09

## (undated) RX ORDER — METOPROLOL TARTRATE 1 MG/ML
INJECTION, SOLUTION INTRAVENOUS
Status: DISPENSED
Start: 2024-03-13

## (undated) RX ORDER — LIDOCAINE HYDROCHLORIDE 10 MG/ML
INJECTION, SOLUTION EPIDURAL; INFILTRATION; INTRACAUDAL; PERINEURAL
Status: DISPENSED
Start: 2024-04-09

## (undated) RX ORDER — IVABRADINE 5 MG/1
TABLET, FILM COATED ORAL
Status: DISPENSED
Start: 2024-03-13

## (undated) RX ORDER — TRIAMCINOLONE ACETONIDE 40 MG/ML
INJECTION, SUSPENSION INTRA-ARTICULAR; INTRAMUSCULAR
Status: DISPENSED
Start: 2022-07-11

## (undated) RX ORDER — CLOPIDOGREL 300 MG/1
TABLET, FILM COATED ORAL
Status: DISPENSED
Start: 2024-04-09

## (undated) RX ORDER — LIDOCAINE HYDROCHLORIDE 10 MG/ML
INJECTION, SOLUTION EPIDURAL; INFILTRATION; INTRACAUDAL; PERINEURAL
Status: DISPENSED
Start: 2022-07-11

## (undated) RX ORDER — NITROGLYCERIN 5 MG/ML
VIAL (ML) INTRAVENOUS
Status: DISPENSED
Start: 2024-04-09

## (undated) RX ORDER — FENTANYL CITRATE 50 UG/ML
INJECTION, SOLUTION INTRAMUSCULAR; INTRAVENOUS
Status: DISPENSED
Start: 2024-04-09

## (undated) RX ORDER — METOPROLOL TARTRATE 50 MG
TABLET ORAL
Status: DISPENSED
Start: 2024-03-13